# Patient Record
Sex: FEMALE | Race: AMERICAN INDIAN OR ALASKA NATIVE
[De-identification: names, ages, dates, MRNs, and addresses within clinical notes are randomized per-mention and may not be internally consistent; named-entity substitution may affect disease eponyms.]

---

## 2017-03-16 ENCOUNTER — HOSPITAL ENCOUNTER (EMERGENCY)
Dept: HOSPITAL 14 - H.ER | Age: 57
LOS: 1 days | Discharge: HOME | End: 2017-03-17
Payer: COMMERCIAL

## 2017-03-16 VITALS
DIASTOLIC BLOOD PRESSURE: 76 MMHG | SYSTOLIC BLOOD PRESSURE: 125 MMHG | RESPIRATION RATE: 15 BRPM | TEMPERATURE: 98.1 F | OXYGEN SATURATION: 96 % | HEART RATE: 84 BPM

## 2017-03-16 VITALS — BODY MASS INDEX: 24.8 KG/M2

## 2017-03-16 DIAGNOSIS — R51: Primary | ICD-10-CM

## 2017-03-16 DIAGNOSIS — Z79.84: ICD-10-CM

## 2017-03-16 DIAGNOSIS — E11.9: ICD-10-CM

## 2017-03-16 DIAGNOSIS — I10: ICD-10-CM

## 2017-03-16 LAB
ALBUMIN/GLOB SERPL: 1.3 {RATIO} (ref 1–2.1)
ALP SERPL-CCNC: 74 U/L (ref 38–126)
ALT SERPL-CCNC: 63 U/L (ref 9–52)
APTT BLD: 24.2 SECONDS (ref 23.3–32.5)
AST SERPL-CCNC: 47 U/L (ref 14–36)
BASOPHILS # BLD AUTO: 0.1 K/UL (ref 0–0.2)
BASOPHILS NFR BLD: 2.4 % (ref 0–2)
BILIRUB SERPL-MCNC: 0.6 MG/DL (ref 0.2–1.3)
BUN SERPL-MCNC: 12 MG/DL (ref 7–17)
CALCIUM SERPL-MCNC: 10 MG/DL (ref 8.4–10.2)
CHLORIDE SERPL-SCNC: 100 MMOL/L (ref 98–107)
CO2 SERPL-SCNC: 26 MMOL/L (ref 22–30)
EOSINOPHIL # BLD AUTO: 0.1 K/UL (ref 0–0.7)
EOSINOPHIL NFR BLD: 3.2 % (ref 0–4)
ERYTHROCYTE [DISTWIDTH] IN BLOOD BY AUTOMATED COUNT: 13.2 % (ref 11.5–14.5)
GLOBULIN SER-MCNC: 3.6 GM/DL (ref 2.2–3.9)
GLUCOSE SERPL-MCNC: 150 MG/DL (ref 65–105)
HCT VFR BLD CALC: 41.4 % (ref 34–47)
LYMPHOCYTES # BLD AUTO: 2.3 K/UL (ref 1–4.3)
LYMPHOCYTES NFR BLD AUTO: 50 % (ref 20–40)
MCH RBC QN AUTO: 29.5 PG (ref 27–31)
MCHC RBC AUTO-ENTMCNC: 33.3 G/DL (ref 33–37)
MCV RBC AUTO: 88.4 FL (ref 81–99)
MONOCYTES # BLD: 0.5 K/UL (ref 0–0.8)
MONOCYTES NFR BLD: 10.4 % (ref 0–10)
NEUTROPHILS # BLD: 1.6 K/UL (ref 1.8–7)
NEUTROPHILS NFR BLD AUTO: 34 % (ref 50–75)
NRBC BLD AUTO-RTO: 0 % (ref 0–0)
PLATELET # BLD: 206 K/UL (ref 130–400)
PMV BLD AUTO: 11 FL (ref 7.2–11.7)
POTASSIUM SERPL-SCNC: 4 MMOL/L (ref 3.6–5)
PROT SERPL-MCNC: 8.1 G/DL (ref 6.3–8.2)
SODIUM SERPL-SCNC: 137 MMOL/L (ref 132–148)
WBC # BLD AUTO: 4.6 K/UL (ref 4.8–10.8)

## 2017-03-16 NOTE — ED PDOC
HPI:  Headache


Time Seen by Provider: 17 21:32


Chief Complaint (Nursing): Headache


Chief Complaint (Provider): Headache


History Per: Patient


History/Exam Limitations: no limitations


Onset/Duration Of Symptoms: Days (x1 week)


Current Symptoms Are (Timing): Still Present


Severity: Moderate


Preceeding Symptoms: None


Associated Symptoms: Other (intermittent dizziness/lightheadedness, 

intermittent drowsiness)


Additional Complaint(s): 


Samantha Cortez is a 56 year old female, with a past medical history inclusive 

of HTN and type II diabetes (medication compliant), who presents to the ED on  for the evaluation of a moderate, diffuse headache that she has 

experienced x1 week. Headache, further described as not worst of life, has been 

accompanied by intermittent senses of dizziness/lightheadedness, occasional 

right hand numbness/tingling (mild) as well as some intermittent feelings of 

drowsiness. Denies chest pain or shortness of breath.





PMD: Tyrone





Past Medical History


Reviewed: Historical Data, Nursing Documentation, Vital Signs


Vital Signs: 





 Last Vital Signs











Temp  98.1 F   17 21:20


 


Pulse  84   17 21:20


 


Resp  15   17 21:20


 


BP  125/76   17 21:20


 


Pulse Ox  96   17 21:20














- Medical History


PMH: Diabetes (type II), HTN


   Denies: Hypercholesterolemia





- Surgical History


Surgical History: No Surg Hx





- Family History


Family History: States: Unknown Family Hx





- Immunization History


Hx Tetanus Toxoid Vaccination: No


Hx Influenza Vaccination: No


Hx Pneumococcal Vaccination: No





- Home Medications


Home Medications: 


 Ambulatory Orders











 Medication  Instructions  Recorded


 


Atorvastatin [Lipitor] 10 mg PO DAILY 16


 


Metformin ER [Glucophage XR] 850 mg PO DAILY 16


 


Aspirin [Aspirin Chewable] 81 mg PO DAILY 17


 


Clindamycin [Cleocin] 300 mg PO TID #30 cap 17


 


Amoxicillin/Clavulanate [Augmentin 1 tab PO BID #14 tab 17





875 MG-125 MG]  


 


Clotrimazole/Betamethasone 1 film TOP BID #30 g 17





[Lotrisone]  


 


Meclizine [Antivert] 25 mg PO Q8H PRN #20 tab 17


 


Famotidine [Pepcid] 40 mg PO DAILY #10 tab 17


 


Ibuprofen [Motrin] 600 mg PO TID PRN #30 tab 17


 


Ondansetron [Zofran] 4 mg PO Q8H #8 tab 17














- Allergies


Allergies/Adverse Reactions: 


 Allergies











Allergy/AdvReac Type Severity Reaction Status Date / Time


 


No Known Allergies Allergy   Verified 17 13:18














Review of Systems


Cardiovascular: Positive for: Light Headedness.  Negative for: Chest Pain


Respiratory: Negative for: Shortness of Breath


Neurological: Positive for: Numbness (occasional, right hand), Headache (diffuse

, not worst of life), Dizziness





Physical Exam





- Reviewed


Nursing Documentation Reviewed: Yes


Vital Signs Reviewed: Yes





- Physical Exam


Appears: Positive for: Non-toxic, No Acute Distress


Head Exam: Positive for: ATRAUMATIC, NORMOCEPHALIC


Skin: Positive for: Normal Color, Warm, Dry


Eye Exam: Positive for: Normal appearance, EOMI, PERRL


Cardiovascular/Chest: Positive for: Regular Rate, Rhythm.  Negative for: Murmur


Respiratory: Positive for: Normal Breath Sounds.  Negative for: Respiratory 

Distress


Extremity: Positive for: Normal ROM


Neurologic/Psych: Positive for: Alert, CNs II-XII (intact), Oriented, Gait (

steady).  Negative for: Motor/Sensory Deficits (5/5 x4 extremities, sensation 

intact), Aphasia, Facial Droop





- Laboratory Results


Result Diagrams: 


 17 21:54





 17 21:54





- ECG


O2 Sat by Pulse Oximetry: 96 (RA)


Pulse Ox Interpretation: Normal





Medical Decision Making


Medical Decision Makin:32


Initial Impression: headache, dizziness/lightheadedness





Initial Plan:


* EKG


* CT Head w/o contrast


* Labs


* Troponin I


* PTT


* PT


* Reevaluation





Pt initially states she was lightheaded and did not have a headache. On re-

evaluation pt reports headache. Tylenol ordered for pain. 


--------------------------------------------------------------------------------

----------------- 


Scribe Attestation:


Documented by Guerita Cardoso, acting as a scribe for Fauzia Lakhani PA-C.





Provider Scribe Attestation:


All medical record entries made by the Scribe were at my direction and 

personally dictated by me. I have reviewed the chart and agree that the record 

accurately reflects my personal performance of the history, physical exam, 

medical decision making, and the department course for this patient. I have 

also personally directed, reviewed, and agree with the discharge instructions 

and disposition.





Disposition





- Clinical Impression


Clinical Impression: 


 Headache





- Patient ED Disposition


Is Patient to be Admitted: No


Counseled Patient/Family Regarding: Diagnosis, Need For Followup





- Disposition


Referrals: 


Chidi Sheikh MD [Medical Doctor] - 


Disposition: Routine/Home


Disposition Time: 23:58


Condition: GOOD


Instructions:  Tension Headache (ED)

## 2017-03-16 NOTE — CT
EXAM:

  CT Head Without Intravenous Contrast.



CLINICAL HISTORY:

  56 years old, female; Pain; Headache; Headache not specified; Additional 

info: Lightheadedness, right hand tingling. Sent prior ct head from 4-1-2016



TECHNIQUE:

  Axial computed tomography images of the head/brain without intravenous 

contrast.  This CT exam was performed using one or more of the following dose 

reduction techniques:  automated exposure control, adjustment of the mA and/or 

kV according to patient size, and/or use of iterative reconstruction technique.

  Coronal and sagittal reformatted images were created and reviewed.



COMPARISON:

  CT - HEAD W/O CONTRAST 4/1/2016 10:49:33 AM



FINDINGS:

  Brain:  There is no evidence of intracranial hemorrhage.  No evidence of 

acute territorial infarction.  No significant white matter disease.  No edema.

  Ventricles:  Unremarkable.  No ventriculomegaly.

  Bones/joints:  Unremarkable.  No acute fracture.

  Soft tissues:  Unremarkable.

  Sinuses:  Unremarkable as visualized.  No acute sinusitis.

  Mastoid air cells:  Unremarkable as visualized.  No mastoid effusion.



There is no significant change from the prior study.



IMPRESSION:     

1.  No evidence for acute intracranial abnormality or displaced calvarial 

fracture.

2.  Additional incidental and/or chronic findings as described.

## 2017-04-08 ENCOUNTER — HOSPITAL ENCOUNTER (EMERGENCY)
Dept: HOSPITAL 14 - H.ER | Age: 57
Discharge: HOME | End: 2017-04-08
Payer: COMMERCIAL

## 2017-04-08 VITALS
SYSTOLIC BLOOD PRESSURE: 146 MMHG | OXYGEN SATURATION: 99 % | RESPIRATION RATE: 20 BRPM | TEMPERATURE: 98.2 F | DIASTOLIC BLOOD PRESSURE: 90 MMHG

## 2017-04-08 VITALS — HEART RATE: 70 BPM

## 2017-04-08 VITALS — BODY MASS INDEX: 24.8 KG/M2

## 2017-04-08 DIAGNOSIS — Z79.84: ICD-10-CM

## 2017-04-08 DIAGNOSIS — R55: ICD-10-CM

## 2017-04-08 DIAGNOSIS — I10: ICD-10-CM

## 2017-04-08 DIAGNOSIS — Z79.82: ICD-10-CM

## 2017-04-08 DIAGNOSIS — Z82.49: ICD-10-CM

## 2017-04-08 DIAGNOSIS — E11.9: ICD-10-CM

## 2017-04-08 DIAGNOSIS — R51: Primary | ICD-10-CM

## 2017-04-08 DIAGNOSIS — R53.1: ICD-10-CM

## 2017-04-08 LAB
ALBUMIN/GLOB SERPL: 1.2 {RATIO} (ref 1–2.1)
ALP SERPL-CCNC: 82 U/L (ref 38–126)
ALT SERPL-CCNC: 44 U/L (ref 9–52)
AST SERPL-CCNC: 44 U/L (ref 14–36)
BASOPHILS # BLD AUTO: 0 K/UL (ref 0–0.2)
BASOPHILS NFR BLD: 0.3 % (ref 0–2)
BILIRUB SERPL-MCNC: 0.5 MG/DL (ref 0.2–1.3)
BUN SERPL-MCNC: 15 MG/DL (ref 7–17)
CALCIUM SERPL-MCNC: 10.4 MG/DL (ref 8.4–10.2)
CHLORIDE SERPL-SCNC: 102 MMOL/L (ref 98–107)
CO2 SERPL-SCNC: 27 MMOL/L (ref 22–30)
EOSINOPHIL # BLD AUTO: 0.2 K/UL (ref 0–0.7)
EOSINOPHIL NFR BLD: 4.5 % (ref 0–4)
ERYTHROCYTE [DISTWIDTH] IN BLOOD BY AUTOMATED COUNT: 13.2 % (ref 11.5–14.5)
ETHANOL SERPL-MCNC: < 10 MG/DL (ref 0–10)
GLOBULIN SER-MCNC: 3.5 GM/DL (ref 2.2–3.9)
GLUCOSE SERPL-MCNC: 127 MG/DL (ref 65–105)
HCT VFR BLD CALC: 42 % (ref 34–47)
LYMPHOCYTES # BLD AUTO: 2.4 K/UL (ref 1–4.3)
LYMPHOCYTES NFR BLD AUTO: 50.4 % (ref 20–40)
MAGNESIUM SERPL-MCNC: 1.7 MG/DL (ref 1.6–2.3)
MCH RBC QN AUTO: 29.4 PG (ref 27–31)
MCHC RBC AUTO-ENTMCNC: 33 G/DL (ref 33–37)
MCV RBC AUTO: 89.1 FL (ref 81–99)
MONOCYTES # BLD: 0.5 K/UL (ref 0–0.8)
MONOCYTES NFR BLD: 11.4 % (ref 0–10)
NEUTROPHILS # BLD: 1.6 K/UL (ref 1.8–7)
NEUTROPHILS NFR BLD AUTO: 33.4 % (ref 50–75)
NRBC BLD AUTO-RTO: 0.2 % (ref 0–0)
PHOSPHATE SERPL-MCNC: 3.5 MG/DL (ref 2.5–4.5)
PLATELET # BLD: 211 K/UL (ref 130–400)
PMV BLD AUTO: 11.4 FL (ref 7.2–11.7)
POTASSIUM SERPL-SCNC: 4 MMOL/L (ref 3.6–5)
PROT SERPL-MCNC: 7.6 G/DL (ref 6.3–8.2)
SODIUM SERPL-SCNC: 143 MMOL/L (ref 132–148)
TSH SERPL-ACNC: 1.3 MIU/ML (ref 0.46–4.68)
WBC # BLD AUTO: 4.7 K/UL (ref 4.8–10.8)

## 2017-04-08 NOTE — ED PDOC
HPI:  Headache


Time Seen by Provider: 17 18:40


Chief Complaint (Nursing): Syncope


Chief Complaint (Provider): Headache


History Per: Patient


History/Exam Limitations: no limitations


Onset/Duration Of Symptoms: Days


Current Symptoms Are (Timing): Better


Severity: Mild


Preceeding Symptoms: None


Additional Complaint(s): 


Samantha Cortez is a 57 year old female, with a past medical history inclusive 

of HTN and type II diabetes (medication complaint, controlled), who presents to 

the ED on 17 for the evaluation of a frontal headache that she has 

experienced intermittently over the past couple of weeks. She also reports some 

occasional tingling within her right hand but that this has been a chronic 

issue. Denies focal weakness or dysarthria, and further reports that pain is 

mild within the ED. Patient was evaluated in the ED for this complaint 

approximately 3 weeks ago via CT and labwork but was discharged home with a 

diagnosis of headache after all studies resulted as normal.





Patient also has an acute secondary complaint of intermittent voice hoarseness 

that she has experienced x6 days. Denies fever, chills, outright throat pain, 

neck swelling, cough, rhinorrhea, nausea or vomiting.





PMD: Tyrone ([facility])





Past Medical History


Reviewed: Historical Data, Nursing Documentation, Vital Signs


Vital Signs: 





 Last Vital Signs











Temp  98.2 F   17 18:25


 


Pulse  72   17 18:25


 


Resp  20   17 18:25


 


BP  146/90   17 18:25


 


Pulse Ox  99   17 18:25














- Medical History


PMH: Diabetes (type II), HTN


   Denies: Hypercholesterolemia





- Surgical History


Other surgeries: right breast biopsy





- Family History


Family History: States: CAD, Diabetes





- Social History


Current smoker - smoking cessation education provided: No


Alcohol: None


Drugs: Denies





- Immunization History


Hx Tetanus Toxoid Vaccination: No


Hx Influenza Vaccination: No


Hx Pneumococcal Vaccination: No





- Home Medications


Home Medications: 


 Ambulatory Orders











 Medication  Instructions  Recorded


 


Atorvastatin [Lipitor] 10 mg PO DAILY 16


 


Metformin ER [Glucophage XR] 850 mg PO DAILY 16


 


Aspirin [Aspirin Chewable] 81 mg PO DAILY 17


 


Clindamycin [Cleocin] 300 mg PO TID #30 cap 17


 


Amoxicillin/Clavulanate [Augmentin 1 tab PO BID #14 tab 17





875 MG-125 MG]  


 


Clotrimazole/Betamethasone 1 film TOP BID #30 g 17





[Lotrisone]  


 


Meclizine [Antivert] 25 mg PO Q8H PRN #20 tab 17


 


Famotidine [Pepcid] 40 mg PO DAILY #10 tab 17


 


Ibuprofen [Motrin] 600 mg PO TID PRN #30 tab 17


 


Ondansetron [Zofran] 4 mg PO Q8H #8 tab 17


 


Naproxen [Naprosyn] 1 tab PO BID PRN #30 tab 17














- Allergies


Allergies/Adverse Reactions: 


 Allergies











Allergy/AdvReac Type Severity Reaction Status Date / Time


 


No Known Allergies Allergy   Verified 17 13:18














Review of Systems


ROS Statement: Except As Marked, All Systems Reviewed And Found Negative


Constitutional: Positive for: Weakness (chronic intermittent generalized 

weakness/fatigue (pt admits to only sleeping 4-5 hrs/night)).  Negative for: 

Fever, Chills


ENT: Positive for: Other (voce hoarseness).  Negative for: Nose Discharge, 

Throat Pain


Respiratory: Negative for: Cough


Gastrointestinal: Negative for: Nausea, Vomiting


Musculoskeletal: Negative for: Neck Pain (no swelling)


Skin: Positive for: Other ("lump" in middle of breasts (found this week))


Neurological: Positive for: Headache (mild in ED, frontal)





Physical Exam





- Reviewed


Nursing Documentation Reviewed: Yes


Vital Signs Reviewed: Yes





- Physical Exam


Appears: Positive for: Non-toxic, No Acute Distress


Head Exam: Positive for: ATRAUMATIC, NORMOCEPHALIC


Skin: Positive for: Normal Color, Warm, Dry


Eye Exam: Positive for: Normal appearance, PERRL


ENT: Positive for: Normal ENT Inspection.  Negative for: Pharyngeal Erythema, 

Tonsillar Exudate, Tonsillar Swelling


Neck: Positive for: Normal, Painless ROM, Supple


Cardiovascular/Chest: Positive for: Regular Rate, Rhythm, Chest Non Tender (no 

palpable breast masses).  Negative for: Edema (no leg swelling), Murmur


Respiratory: Positive for: Normal Breath Sounds.  Negative for: Respiratory 

Distress


Gastrointestinal/Abdominal: Positive for: Normal Exam, Soft.  Negative for: 

Tenderness


Back: Positive for: Normal Inspection


Extremity: Positive for: Normal ROM.  Negative for: Swelling


Neurologic/Psych: Positive for: Alert, Oriented





- Laboratory Results


Result Diagrams: 


 17 19:05





 17 19:05





- ECG


ECG: Positive for: Interpreted By Me, Viewed By Me


ECG Rhythm: Positive for: Normal QRS, Normal ST Segment, Sinus Rhythm


Rate: 70


O2 Sat by Pulse Oximetry: 99 (RA)


Pulse Ox Interpretation: Normal





Medical Decision Making


Medical Decision Makin:40


Initial Impression: headache, voice hoarseness, occasional generalized weakness





Differential diagnoses include but are not limited to migraine, electrolyte 

abnormality, hypothyroidism.





Initial Plan:


* EKG


* Labs


* Alcohol Serum


* Magnesium


* Phosphorus


* TSH


* Glucose/Blood/POC


* Udip


* Urine Drug Screen


* Rapid Strep


* Reevaluation





10p On reeval pt is sleeping comfortably. No clinically significant lab 

abnormalities. DW pt findings and plan of care.





--------------------------------------------------------------------------------

----------------- 


Scribe Attestation:


Documented by Guerita Cardoso, acting as a scribe for Lesley Marcus MD.





Provider Scribe Attestation:


All medical record entries made by the Scribe were at my direction and 

personally dictated by me. I have reviewed the chart and agree that the record 

accurately reflects my personal performance of the history, physical exam, 

medical decision making, and the department course for this patient. I have 

also personally directed, reviewed, and agree with the discharge instructions 

and disposition.





Disposition





- Clinical Impression


Clinical Impression: 


 Headache


Counseled Patient/Family Regarding: Studies Performed, Diagnosis, Need For 

Followup, Rx Given





- Disposition


Referrals: 


 Service [Outside]


Gail Varela MD [Staff Provider] - 


Disposition: Routine/Home


Disposition Time: 22:00


Condition: IMPROVED


Additional Instructions: 


FOLLOW UP WITH DR DELATORRE MONDAY AND ALSO TRY TO SEE A NEUROLOGIST BY THE END OF 

NEXT WEEK AS WELL.


Prescriptions: 


Naproxen [Naprosyn] 1 tab PO BID PRN #30 tab


 PRN Reason: Pain


Instructions:  Acute Headache (ED), Weakness (ED)

## 2017-04-09 NOTE — CARD
--------------- APPROVED REPORT --------------





EKG Measurement

Heart [id number]

[place] [id number]

[id number]

[id number]

[id number]



<Conclusion>

Normal sinus rhythm

Normal ECG

## 2017-05-15 ENCOUNTER — HOSPITAL ENCOUNTER (EMERGENCY)
Dept: HOSPITAL 14 - H.ER | Age: 57
Discharge: HOME | End: 2017-05-15
Payer: COMMERCIAL

## 2017-05-15 VITALS
SYSTOLIC BLOOD PRESSURE: 114 MMHG | RESPIRATION RATE: 16 BRPM | OXYGEN SATURATION: 95 % | TEMPERATURE: 98.9 F | HEART RATE: 94 BPM | DIASTOLIC BLOOD PRESSURE: 76 MMHG

## 2017-05-15 VITALS — BODY MASS INDEX: 24.8 KG/M2

## 2017-05-15 DIAGNOSIS — E11.9: ICD-10-CM

## 2017-05-15 DIAGNOSIS — Z79.82: ICD-10-CM

## 2017-05-15 DIAGNOSIS — Z79.84: ICD-10-CM

## 2017-05-15 DIAGNOSIS — I10: ICD-10-CM

## 2017-05-15 DIAGNOSIS — N39.0: Primary | ICD-10-CM

## 2017-05-15 LAB
ALBUMIN/GLOB SERPL: 1.3 {RATIO} (ref 1–2.1)
ALP SERPL-CCNC: 84 U/L (ref 38–126)
ALT SERPL-CCNC: 60 U/L (ref 9–52)
AST SERPL-CCNC: 40 U/L (ref 14–36)
BACTERIA #/AREA URNS HPF: (no result) /[HPF]
BASOPHILS # BLD AUTO: 0.1 K/UL (ref 0–0.2)
BASOPHILS NFR BLD: 1 % (ref 0–2)
BILIRUB SERPL-MCNC: 0.6 MG/DL (ref 0.2–1.3)
BILIRUB UR-MCNC: NEGATIVE MG/DL
BUN SERPL-MCNC: 9 MG/DL (ref 7–17)
CALCIUM SERPL-MCNC: 9.8 MG/DL (ref 8.4–10.2)
CHLORIDE SERPL-SCNC: 99 MMOL/L (ref 98–107)
CO2 SERPL-SCNC: 26 MMOL/L (ref 22–30)
COLOR UR: YELLOW
EOSINOPHIL # BLD AUTO: 0.1 K/UL (ref 0–0.7)
EOSINOPHIL NFR BLD: 1 % (ref 0–4)
ERYTHROCYTE [DISTWIDTH] IN BLOOD BY AUTOMATED COUNT: 13.3 % (ref 11.5–14.5)
GLOBULIN SER-MCNC: 3.3 GM/DL (ref 2.2–3.9)
GLUCOSE SERPL-MCNC: 224 MG/DL (ref 65–105)
GLUCOSE UR STRIP-MCNC: 150 MG/DL
HCT VFR BLD CALC: 42 % (ref 34–47)
KETONES UR STRIP-MCNC: NEGATIVE MG/DL
LEUKOCYTE ESTERASE UR-ACNC: (no result) LEU/UL
LYMPHOCYTES # BLD AUTO: 1.2 K/UL (ref 1–4.3)
LYMPHOCYTES NFR BLD AUTO: 20.3 % (ref 20–40)
MCH RBC QN AUTO: 29.6 PG (ref 27–31)
MCHC RBC AUTO-ENTMCNC: 33.4 G/DL (ref 33–37)
MCV RBC AUTO: 88.6 FL (ref 81–99)
MONOCYTES # BLD: 0.6 K/UL (ref 0–0.8)
MONOCYTES NFR BLD: 10.3 % (ref 0–10)
NEUTROPHILS # BLD: 4 K/UL (ref 1.8–7)
NEUTROPHILS NFR BLD AUTO: 67.4 % (ref 50–75)
NRBC BLD AUTO-RTO: 0.1 % (ref 0–0)
PH UR STRIP: 5 [PH] (ref 5–8)
PLATELET # BLD: 159 K/UL (ref 130–400)
PMV BLD AUTO: 11.2 FL (ref 7.2–11.7)
POTASSIUM SERPL-SCNC: 3.6 MMOL/L (ref 3.6–5)
PROT SERPL-MCNC: 7.7 G/DL (ref 6.3–8.2)
PROT UR STRIP-MCNC: 30 MG/DL
RBC # UR STRIP: (no result) /UL
RBC #/AREA URNS HPF: 15 /HPF (ref 0–3)
SODIUM SERPL-SCNC: 135 MMOL/L (ref 132–148)
SP GR UR STRIP: 1.02 (ref 1–1.03)
UROBILINOGEN UR-MCNC: (no result) MG/DL (ref 0.2–1)
WBC # BLD AUTO: 5.9 K/UL (ref 4.8–10.8)
WBC #/AREA URNS HPF: 25 /HPF (ref 0–5)

## 2017-05-15 NOTE — ED PDOC
- Laboratory Results


Result Diagrams: 


 05/15/17 17:35





 05/15/17 17:35





- ECG


O2 Sat by Pulse Oximetry: 95





- Progress


ED Course And Treament: 





Case endorsed to writer from Eleno FULLER pending labs, urine





On re-eval, patient states she is feeling better.  Patient without abdominal,

flank, or CVA tenderness.


Patient educated on findings, discharged with rx cipro (dose given), ibuprofen.


Advised follow up PMD 2-3 days.


Return to ED for worsening/concerning symptoms.





Disposition





- Clinical Impression


Clinical Impression: 


 Urinary tract infection








- POA


Present On Arrival: None





- Disposition


Disposition: Routine/Home


Disposition Time: 21:29


Condition: IMPROVED


Prescriptions: 


Ciprofloxacin HCl [Cipro] 500 mg PO BID #13 tab


Ibuprofen [Motrin Tab] 1 tab PO Q6 PRN #20 tab


 PRN Reason: Fever >100.4 F


Instructions:  Urinary Tract Infection in Women (ED)

## 2017-05-15 NOTE — ED PDOC
HPI: Abdomen


Time Seen by Provider: 05/15/17 17:44


Chief Complaint (Nursing): Fever


Chief Complaint (Provider): Fever


History Per: Patient


Additional Complaint(s): 





56 yo female, no PMH, presents into ER c/o fever and painful urination since 

yesterday. states she did not take any meds for fever. 


No nausea or vomiting. No abdominal pain or back pain





Past Medical History


Reviewed: Nursing Documentation, Vital Signs


Vital Signs: 


 Last Vital Signs











Temp  100.8 F H  05/15/17 17:24


 


Pulse  110 H  05/15/17 17:24


 


Resp  20   05/15/17 17:24


 


BP  150/92 H  05/15/17 17:24


 


Pulse Ox  96   05/15/17 18:31














- Medical History


PMH: Diabetes (type II), HTN


   Denies: Hypercholesterolemia





- Family History


Family History: States: Unknown Family Hx, CAD, Diabetes





- Living Arrangements


Living Arrangements: With Family





- Social History


Current smoker - smoking cessation education provided: No


Alcohol: None


Drugs: Denies





- Immunization History


Hx Tetanus Toxoid Vaccination: No


Hx Influenza Vaccination: No


Hx Pneumococcal Vaccination: No





- Home Medications


Home Medications: 


 Ambulatory Orders











 Medication  Instructions  Recorded


 


Atorvastatin [Lipitor] 10 mg PO DAILY 12/02/16


 


Metformin ER [Glucophage XR] 850 mg PO DAILY 12/02/16


 


Aspirin [Aspirin Chewable] 81 mg PO DAILY 01/03/17


 


Clindamycin [Cleocin] 300 mg PO TID #30 cap 01/03/17


 


Amoxicillin/Clavulanate [Augmentin 1 tab PO BID #14 tab 01/14/17





875 MG-125 MG]  


 


Clotrimazole/Betamethasone 1 film TOP BID #30 g 01/14/17





[Lotrisone]  


 


Meclizine [Antivert] 25 mg PO Q8H PRN #20 tab 01/16/17


 


Famotidine [Pepcid] 40 mg PO DAILY #10 tab 01/22/17


 


Ibuprofen [Motrin] 600 mg PO TID PRN #30 tab 01/22/17


 


Ondansetron [Zofran] 4 mg PO Q8H #8 tab 01/22/17


 


Naproxen [Naprosyn] 1 tab PO BID PRN #30 tab 04/08/17














- Allergies


Allergies/Adverse Reactions: 


 Allergies











Allergy/AdvReac Type Severity Reaction Status Date / Time


 


No Known Allergies Allergy   Verified 05/15/17 17:24














Review of Systems


ROS Statement: Except As Marked, All Systems Reviewed And Found Negative





Physical Exam





- Reviewed


Nursing Documentation Reviewed: Yes


Vital Signs Reviewed: Yes





- Physical Exam


Appears: Positive for: Well, Non-toxic, No Acute Distress


Head Exam: Positive for: ATRAUMATIC, NORMAL INSPECTION, NORMOCEPHALIC


Skin: Positive for: Normal Color, Warm, DRY


Eye Exam: Positive for: EOMI, Normal appearance, PERRL


ENT: Positive for: Normal ENT Inspection


Neck: Positive for: Normal, Painless ROM


Cardiovascular/Chest: Positive for: Regular Rate, Rhythm


Respiratory: Positive for: CNT, Normal Breath Sounds


Gastrointestinal/Abdominal: Positive for: Normal Exam, Bowel Sounds, Soft


Back: Positive for: Normal Inspection


Extremity: Positive for: Normal ROM


Neurologic/Psych: Positive for: Alert, Oriented





- Laboratory Results


Result Diagrams: 


 05/15/17 17:35





 05/15/17 17:35





- ECG


O2 Sat by Pulse Oximetry: 96





Medical Decision Making


Medical Decision Making: 





IV access established and treatment initiated with IVF and Toradol





CBC resulted WNL


COMP with elevated glucose 224, IVF running








UA pending 19:47





Case endorsed to ALINA Ibarra at 20:00 pending UA and antibiotics if needed








Disposition





- Clinical Impression


Clinical Impression: 


 Urinary tract infection








- Patient ED Disposition


Is Patient to be Admitted: No





- Disposition


Disposition: Transfer of Care ([facility])


Disposition Time: 19:45


Condition: STABLE





- POA


Present On Arrival: Poor Glycemic Control

## 2017-05-17 ENCOUNTER — HOSPITAL ENCOUNTER (EMERGENCY)
Dept: HOSPITAL 14 - H.ER | Age: 57
Discharge: HOME | End: 2017-05-17
Payer: COMMERCIAL

## 2017-05-17 VITALS
HEART RATE: 86 BPM | OXYGEN SATURATION: 98 % | TEMPERATURE: 99.2 F | DIASTOLIC BLOOD PRESSURE: 78 MMHG | SYSTOLIC BLOOD PRESSURE: 144 MMHG | RESPIRATION RATE: 18 BRPM

## 2017-05-17 VITALS — BODY MASS INDEX: 24.8 KG/M2

## 2017-05-17 DIAGNOSIS — E11.9: ICD-10-CM

## 2017-05-17 DIAGNOSIS — I10: ICD-10-CM

## 2017-05-17 DIAGNOSIS — N39.0: Primary | ICD-10-CM

## 2017-05-17 LAB
BACTERIA #/AREA URNS HPF: (no result) /[HPF]
BILIRUB UR-MCNC: NEGATIVE MG/DL
COLOR UR: YELLOW
GLUCOSE UR STRIP-MCNC: >=500 MG/DL
KETONES UR STRIP-MCNC: NEGATIVE MG/DL
LEUKOCYTE ESTERASE UR-ACNC: (no result) LEU/UL
PH UR STRIP: 6 [PH] (ref 5–8)
PROT UR STRIP-MCNC: 100 MG/DL
RBC # UR STRIP: NEGATIVE /UL
RBC #/AREA URNS HPF: 3 /HPF (ref 0–3)
SP GR UR STRIP: 1.01 (ref 1–1.03)
UROBILINOGEN UR-MCNC: (no result) MG/DL (ref 0.2–1)
WBC #/AREA URNS HPF: 12 /HPF (ref 0–5)

## 2017-05-17 NOTE — ED PDOC
HPI: General Adult


Time Seen by Provider: 05/17/17 19:51


Chief Complaint (Nursing): Flu-like Symptoms


History Per: Patient


History/Exam Limitations: no limitations


Onset/Duration Of Symptoms: Days (X 2)


Have you had recent travel within the past 21 days to any of the following 

countries: [place], [place], [place] or [place]?: No


Additional Complaint(s): 





Samantha Cortez is a 57 year old female, with no previous medical history, who 

presents to the ED with complaints of dysuria persisting for the past two days. 

Patient was seen in the ED 2 days ago and reports symptoms still persisting and 

reports developing a cough. Patient denies any nausea, vomiting, fever chills. 





PMD: none provided





Past Medical History


Reviewed: Historical Data, Nursing Documentation, Vital Signs


Vital Signs: 


 Last Vital Signs











Temp  99.2 F   05/17/17 19:44


 


Pulse  86   05/17/17 19:44


 


Resp  18   05/17/17 19:44


 


BP  144/78   05/17/17 19:44


 


Pulse Ox  98   05/17/17 20:35














- Medical History


PMH: Diabetes (type II), HTN


   Denies: Hypercholesterolemia





- Family History


Family History: States: Unknown Family Hx, CAD, Diabetes





- Immunization History


Hx Tetanus Toxoid Vaccination: No


Hx Influenza Vaccination: No


Hx Pneumococcal Vaccination: No





- Home Medications


Home Medications: 


 Ambulatory Orders











 Medication  Instructions  Recorded


 


Atorvastatin [Lipitor] 10 mg PO DAILY 12/02/16


 


Metformin ER [Glucophage XR] 850 mg PO DAILY 12/02/16


 


Aspirin [Aspirin Chewable] 81 mg PO DAILY 01/03/17


 


Clindamycin [Cleocin] 300 mg PO TID #30 cap 01/03/17


 


Amoxicillin/Clavulanate [Augmentin 1 tab PO BID #14 tab 01/14/17





875 MG-125 MG]  


 


Clotrimazole/Betamethasone 1 film TOP BID #30 g 01/14/17





[Lotrisone]  


 


Meclizine [Antivert] 25 mg PO Q8H PRN #20 tab 01/16/17


 


Famotidine [Pepcid] 40 mg PO DAILY #10 tab 01/22/17


 


Ibuprofen [Motrin] 600 mg PO TID PRN #30 tab 01/22/17


 


Ondansetron [Zofran] 4 mg PO Q8H #8 tab 01/22/17


 


Naproxen [Naprosyn] 1 tab PO BID PRN #30 tab 04/08/17


 


Ciprofloxacin HCl [Cipro] 500 mg PO BID #13 tab 05/15/17


 


Ibuprofen [Motrin Tab] 1 tab PO Q6 PRN #20 tab 05/15/17














- Allergies


Allergies/Adverse Reactions: 


 Allergies











Allergy/AdvReac Type Severity Reaction Status Date / Time


 


No Known Allergies Allergy   Verified 05/15/17 17:24














Review of Systems


ROS Statement: Except As Marked, All Systems Reviewed And Found Negative


Constitutional: Negative for: Fever, Chills


Respiratory: Positive for: Cough


Gastrointestinal: Negative for: Nausea, Vomiting


Genitourinary Female: Positive for: Dysuria





Physical Exam





- Reviewed


Nursing Documentation Reviewed: Yes


Vital Signs Reviewed: Yes





- Physical Exam


Appears: Positive for: Well, Non-toxic, No Acute Distress


Head Exam: Positive for: ATRAUMATIC, NORMAL INSPECTION, NORMOCEPHALIC


Skin: Positive for: Normal Color, Warm, DRY


Eye Exam: Positive for: EOMI, Normal appearance, PERRL


ENT: Positive for: Normal ENT Inspection


Neck: Positive for: Normal, Painless ROM


Cardiovascular/Chest: Positive for: Regular Rate, Rhythm


Respiratory: Positive for: CNT, Normal Breath Sounds


Neurologic/Psych: Positive for: Alert, Oriented





- ECG


O2 Sat by Pulse Oximetry: 98 (RA)


Pulse Ox Interpretation: Normal





Medical Decision Making


Medical Decision Making: 





Initial Plan: 


* urinalysis 


* urine culture 


* CXR








CXR without acute abnormalities


Urine appears improved from previous. Blood cultures from 2 days ago negative. 

Urine culture <100,000units





--------------------------------------------------------------------------------

----------------- 


Scribe Attestation:


Documented by Saranya Freire, acting as a scribe for Fauzia Lakhani PA-C.





Provider Scribe Attestation:


All medical record entries made by the Scribe were at my direction and 

personally dictated by me. I have reviewed the chart and agree that the record 

accurately reflects my personal performance of the history, physical exam, 

medical decision making, and the department course for this patient. I have 

also personally directed, reviewed, and agree with the discharge instructions 

and disposition.





Disposition





- Clinical Impression


Clinical Impression: 


 Urinary tract infection








- Patient ED Disposition


Is Patient to be Admitted: No


Counseled Patient/Family Regarding: Diagnosis, Need For Followup





- Disposition


Referrals: 


[facility] [Outside]


Disposition: Routine/Home


Disposition Time: 21:57


Condition: GOOD


Additional Instructions: 


Please follow-up with PMD.


Continue Cirpo. 


Instructions:  Urinary Tract Infection in Women (ED)

## 2017-05-18 NOTE — RAD
HISTORY:

cough  



COMPARISON:

1/22/2017



TECHNIQUE:

Chest PA and lateral



FINDINGS:



LUNGS:

No active pulmonary disease.



PLEURA:

No significant pleural effusion identified. No pneumothorax apparent.



CARDIOVASCULAR:

Normal.



OSSEOUS STRUCTURES:

No significant abnormalities.



VISUALIZED UPPER ABDOMEN:

Normal.



OTHER FINDINGS:

None.



IMPRESSION:

No active disease.

## 2017-07-19 ENCOUNTER — HOSPITAL ENCOUNTER (EMERGENCY)
Dept: HOSPITAL 31 - C.ER | Age: 57
Discharge: HOME | End: 2017-07-19
Payer: COMMERCIAL

## 2017-07-19 VITALS — RESPIRATION RATE: 18 BRPM

## 2017-07-19 VITALS — SYSTOLIC BLOOD PRESSURE: 120 MMHG | TEMPERATURE: 98.4 F | HEART RATE: 95 BPM | DIASTOLIC BLOOD PRESSURE: 80 MMHG

## 2017-07-19 VITALS — BODY MASS INDEX: 24.8 KG/M2

## 2017-07-19 VITALS — OXYGEN SATURATION: 99 %

## 2017-07-19 DIAGNOSIS — Z79.84: ICD-10-CM

## 2017-07-19 DIAGNOSIS — E11.65: Primary | ICD-10-CM

## 2017-07-19 NOTE — C.PDOC
History Of Present Illness


Patient is a 56 y/o female, with past medical history of diabetes, presents to 

the emergency department for evaluation of high blood sugar level. Patient 

states she was recently started on Januvia. Patient states her local pharmacist 

had checked her sugar level and was found to be in "300s", and was told to 

report to the ER. Patient denies any physical complaints at this time. 

Accucheck on arrival was 289mg/dl.





Time Seen by Provider: 07/19/17 15:55


Chief Complaint (Nursing): High Blood Sugar


History Per: Patient


History/Exam Limitations: no limitations


Onset/Duration Of Symptoms: Gradual


Current Symptoms Are (Timing): Still Present


Severity: None


Pain Scale Rating Of: 0


Current Diabetic Medications: Oral Medication


Associated Infectious Symptoms: denies: Cough, Sore Throat, Sinus Congestion, 

Dysuria, Urinary Urgency, Urinary Frequency, Nausea, Vomiting, Diarrhea


Treatment Prior To Provider Evaluation: Accucheck


Recent travel outside of the [place]: No


Additional History Per: Patient





Past Medical History


Reviewed: Historical Data, Nursing Documentation, Vital Signs


Vital Signs: 


 Last Vital Signs











Temp  98.4 F   07/19/17 16:31


 


Pulse  95 H  07/19/17 16:31


 


Resp  18   07/19/17 16:31


 


BP  120/80   07/19/17 16:31


 


Pulse Ox  99   07/19/17 16:36














- Medical History


PMH: Diabetes (type II), HTN


   Denies: Hypercholesterolemia


Family History: States: Unknown Family Hx, CAD, Diabetes





- Social History


Hx Tobacco Use: No


Hx Alcohol Use: No


Hx Substance Use: No





- Immunization History


Hx Tetanus Toxoid Vaccination: No


Hx Influenza Vaccination: No


Hx Pneumococcal Vaccination: No





Review Of Systems


Except As Marked, All Systems Reviewed And Found Negative.


Constitutional: Negative for: Fever, Chills


Cardiovascular: Negative for: Chest Pain, Palpitations, Light Headedness


Respiratory: Negative for: Cough, Shortness of Breath


Gastrointestinal: Negative for: Nausea, Vomiting, Abdominal Pain


Neurological: Negative for: Headache, Dizziness





Physical Exam





- Physical Exam


Appears: Non-toxic, No Acute Distress


Skin: Normal Color, Warm, Dry


Head: Atraumatic, Normacephalic


Eye(s): bilateral: Normal Inspection


Neck: Normal ROM, Supple


Chest: Symmetrical


Cardiovascular: Rhythm Regular, No Murmur


Respiratory: Normal Breath Sounds, No Rales, No Rhonchi, No Wheezing


Gastrointestinal/Abdominal: Soft, No Tenderness


Extremity: Bilateral: Atraumatic, Normal ROM


Neurological/Psych: Oriented x3, Normal Speech, Normal Cognition


Gait: Steady





ED Course And Treatment


O2 Sat by Pulse Oximetry: 99





Medical Decision Making


Medical Decision Making: 





Patient found to have high blood sugar today and out of concern come to ED for 

assurance


Patient started on new medication today


denies any other complaint


requests discharge home





Disposition


Counseled Patient/Family Regarding: Diagnosis, Need For Followup





- Disposition


Referrals: 


Amarilis Jernigan MD [Medical Doctor] - 


Disposition: HOME/ ROUTINE


Disposition Time: 16:10


Condition: GOOD


Additional Instructions: 


Follow up with your PMD


Take medications as directed


Instructions:  Diabetic Hyperglycemia (ED)





- POA


Present On Arrival: None





- Clinical Impression


Clinical Impression: 


 Hyperglycemia








- PA / NP / Resident Statement


MD/DO has reviewed & agrees with the documentation as recorded.





- Scribe Statement


The provider has reviewed the documentation as recorded by the Scribe





Yunior Asencio





All medical record entries made by the Scribe were at my direction and 

personally dictated by me. I have reviewed the chart and agree that the record 

accurately reflects my personal performance of the history, physical exam, 

medical decision making, and the department course for this patient. I have 

also personally directed, reviewed, and agree with the discharge instructions 

and disposition.

## 2017-10-07 ENCOUNTER — HOSPITAL ENCOUNTER (EMERGENCY)
Dept: HOSPITAL 31 - C.ER | Age: 57
Discharge: HOME | End: 2017-10-07
Payer: COMMERCIAL

## 2017-10-07 VITALS — BODY MASS INDEX: 24.8 KG/M2

## 2017-10-07 VITALS
HEART RATE: 80 BPM | SYSTOLIC BLOOD PRESSURE: 124 MMHG | DIASTOLIC BLOOD PRESSURE: 74 MMHG | OXYGEN SATURATION: 98 % | TEMPERATURE: 97.9 F | RESPIRATION RATE: 18 BRPM

## 2017-10-07 DIAGNOSIS — K06.8: Primary | ICD-10-CM

## 2017-10-07 NOTE — C.PDOC
History Of Present Illness


56 yo female c/o gum pain for 1 week. Does not know what caused the pain, she 

hast had teeth there in a long time. No trauma. No swelling, no fever, no 

difficulty breathing or swallowing. PT admits that she can not recall the last 

time she went to the dentist. 


Time Seen by Provider: 10/07/17 13:07


Chief Complaint (Nursing): Dental Pain


History Per: Patient


History/Exam Limitations: no limitations


Onset/Duration Of Symptoms: Days


Current Symptoms Are (Timing): Still Present





Past Medical History


Vital Signs: 


 Last Vital Signs











Temp  97.9 F   10/07/17 13:10


 


Pulse  80   10/07/17 13:10


 


Resp  18   10/07/17 13:10


 


BP  124/74   10/07/17 13:10


 


Pulse Ox  98   10/07/17 13:24














- Medical History


PMH: Diabetes (type II), HTN


   Denies: Hypercholesterolemia


Family History: States: Unknown Family Hx, CAD, Diabetes





- Social History


Hx Tobacco Use: No


Hx Alcohol Use: No


Hx Substance Use: No





- Immunization History


Hx Tetanus Toxoid Vaccination: No


Hx Influenza Vaccination: No


Hx Pneumococcal Vaccination: No





Review Of Systems


Except As Marked, All Systems Reviewed And Found Negative.


Constitutional: Negative for: Fever


Cardiovascular: Negative for: Chest Pain


Respiratory: Negative for: Shortness of Breath





Physical Exam





- Physical Exam


Appears: Well, Non-toxic, No Acute Distress


Skin: Normal Color, Warm, Dry


Head: Atraumatic, Normacephalic


Eye(s): bilateral: Normal Inspection, PERRL, EOMI


Nose: Normal


Oral Mucosa: Moist


Teeth: No Normal Dentition (poor dentition), Caries


Gingiva: Tender (left maxillary gingiva, no bleeding, no swelling,  no erythema 

or fluctuance)


Throat: Normal, No Erythema, No Exudate


Neck: Normal, Normal ROM, Supple


Chest: Symmetrical


Cardiovascular: Rhythm Regular


Respiratory: Normal Breath Sounds


Back: Normal Inspection


Extremity: Normal ROM


Neurological/Psych: Oriented x3, Normal Speech, Normal Cognition





ED Course And Treatment


O2 Sat by Pulse Oximetry: 98


Progress Note: PT instructed to follow up with dentist.





Disposition





- Disposition


Referrals: 


[facility] [facility] [Outside]


Disposition: HOME/ ROUTINE


Disposition Time: 13:23


Condition: STABLE


Additional Instructions: 


Follow up with your primary medical doctor or clinic in 2-5 days for further 

evaluation. Take medications as prescribed. Return to the emergency department 

at any time if symptoms persist or worsen.


Prescriptions: 


Amoxicillin 875 mg PO BID #14 tablet


Ketorolac Tromethamine [Toradol] 10 mg PO Q6 PRN #20 tab


 PRN Reason: Pain, Moderate (4-7)


Instructions:  Toothache (ED)


Forms:  [facility] (English)





- Clinical Impression


Clinical Impression: 


 Pain in gums

## 2017-10-18 ENCOUNTER — HOSPITAL ENCOUNTER (EMERGENCY)
Dept: HOSPITAL 31 - C.ER | Age: 57
Discharge: HOME | End: 2017-10-18
Payer: COMMERCIAL

## 2017-10-18 VITALS — RESPIRATION RATE: 18 BRPM

## 2017-10-18 VITALS
OXYGEN SATURATION: 97 % | HEART RATE: 90 BPM | TEMPERATURE: 97.7 F | SYSTOLIC BLOOD PRESSURE: 132 MMHG | DIASTOLIC BLOOD PRESSURE: 87 MMHG

## 2017-10-18 VITALS — BODY MASS INDEX: 23.3 KG/M2

## 2017-10-18 DIAGNOSIS — R51: Primary | ICD-10-CM

## 2017-10-18 NOTE — C.PDOC
History Of Present Illness


56 y/o female with PMHx of DM presents to ED with complaints of headache with 

associated nausea for 1 week. Patient denies fever, chills, vision changes, 

dizziness, weakness, numbness or any other complaints at this time.Patient 

requesting something to eat


Time Seen by Provider: 10/18/17 07:52


Chief Complaint (Nursing): Headache


History Per: Patient


History/Exam Limitations: no limitations


Onset/Duration Of Symptoms: Days


Current Symptoms Are (Timing): Still Present


Severity: Mild


Preceeding Symptoms: denies: Visual Disturbances


Associated Symptoms: Nausea.  denies: Photophobia, Blurred Vision





Past Medical History


Reviewed: Historical Data, Nursing Documentation, Vital Signs


Vital Signs: 


 Last Vital Signs











Temp  97.7 F   10/18/17 07:59


 


Pulse  90   10/18/17 07:59


 


Resp  18   10/18/17 08:57


 


BP  132/87   10/18/17 07:59


 


Pulse Ox  97   10/18/17 09:01














- Medical History


PMH: Diabetes (type II), HTN


Surgical History: No Surg Hx


Family History: States: CAD, Diabetes





- Social History


Hx Tobacco Use: No


Hx Alcohol Use: No


Hx Substance Use: No





- Immunization History


Hx Tetanus Toxoid Vaccination: No


Hx Influenza Vaccination: No


Hx Pneumococcal Vaccination: No





Review Of Systems


Constitutional: Negative for: Fever, Chills


Eyes: Negative for: Vision Change


Gastrointestinal: Positive for: Nausea.  Negative for: Vomiting, Abdominal Pain


Neurological: Positive for: Headache.  Negative for: Weakness, Numbness, 

Dizziness





Physical Exam





- Physical Exam


Appears: Non-toxic, No Acute Distress


Skin: Normal Color, Warm, Dry, No Rash


Head: Atraumatic, Normacephalic


Oral Mucosa: Moist


Neck: Normal ROM, Supple


Chest: Symmetrical


Cardiovascular: Rhythm Regular


Respiratory: Normal Breath Sounds, No Rales, No Rhonchi, No Wheezing


Extremity: Normal ROM, Capillary Refill (<2seconds)


Neurological/Psych: Oriented x3, Normal Speech, Normal Cognition, Normal Motor, 

Normal Sensation


Gait: Steady





ED Course And Treatment


O2 Sat by Pulse Oximetry: 97 (RA)


Pulse Ox Interpretation: Normal


Progress Note: Treated with motrin and zofran.  On re-evaluation feeling better

, neuro intact, ambulating with steady gait


Reassessment Condition: Improved





Medical Decision Making


Medical Decision Making: 


Patient upon evaluation is requesting breakfast





Plan: Motrin, Zofran 





Disposition


Counseled Patient/Family Regarding: Studies Performed, Diagnosis, Need For 

Followup





- Disposition


Referrals: 


[facility] [facility] [Outside]


[facility] [Outside]


Disposition: HOME/ ROUTINE


Disposition Time: 09:00


Condition: STABLE


Instructions:  General Headache (ED)


Forms:  [facility] (English)





- POA


Present On Arrival: None





- Clinical Impression


Clinical Impression: 


 Headache








- PA / NP / Resident Statement


MD/DO has reviewed & agrees with the documentation as recorded.





- Scribe Statement


The provider has reviewed the documentation as recorded by the Scribdanyell Engel





All medical record entries made by the Humberto were at my direction and 

personally dictated by me. I have reviewed the chart and agree that the record 

accurately reflects my personal performance of the history, physical exam, 

medical decision making, and the department course for this patient. I have 

also personally directed, reviewed, and agree with the discharge instructions 

and disposition.

## 2017-11-23 ENCOUNTER — HOSPITAL ENCOUNTER (EMERGENCY)
Dept: HOSPITAL 31 - C.ER | Age: 57
Discharge: HOME | End: 2017-11-23
Payer: COMMERCIAL

## 2017-11-23 VITALS — RESPIRATION RATE: 18 BRPM

## 2017-11-23 VITALS — BODY MASS INDEX: 23.3 KG/M2

## 2017-11-23 VITALS — TEMPERATURE: 97.4 F | SYSTOLIC BLOOD PRESSURE: 157 MMHG | HEART RATE: 80 BPM | DIASTOLIC BLOOD PRESSURE: 106 MMHG

## 2017-11-23 VITALS — OXYGEN SATURATION: 100 %

## 2017-11-23 DIAGNOSIS — K02.9: Primary | ICD-10-CM

## 2017-11-23 NOTE — C.PDOC
History Of Present Illness


57 year old female presents to the ED c/o dental pain for the past 3 weeks. 

Patient states the doctor she used to go to stopped taking her insurance so she 

needs to find a new one. Patient was seen here before and was prescribed 

antibiotics and pain killers. 


Time Seen by Provider: 11/23/17 15:42


Chief Complaint (Nursing): Dental Pain


History Per: Patient


History/Exam Limitations: no limitations


Onset/Duration Of Symptoms: Days


Current Symptoms Are (Timing): Still Present


Quality: Positive for: "Pain"


Recent travel outside of the [place]: No


Additional History Per: Patient





Past Medical History


Reviewed: Historical Data, Nursing Documentation, Vital Signs


Vital Signs: 


 Last Vital Signs











Temp  97.4 F L  11/23/17 16:48


 


Pulse  80   11/23/17 16:48


 


Resp  18   11/23/17 16:48


 


BP  157/106 H  11/23/17 16:48


 


Pulse Ox  98   11/23/17 16:48














- Medical History


PMH: Diabetes (type II), HTN


   Denies: Hypercholesterolemia


Surgical History: No Surg Hx


Family History: States: Unknown Family Hx, CAD, Diabetes





- Social History


Hx Tobacco Use: No


Hx Alcohol Use: No


Hx Substance Use: No





- Immunization History


Hx Tetanus Toxoid Vaccination: No


Hx Influenza Vaccination: No


Hx Pneumococcal Vaccination: No





Review Of Systems


Constitutional: Negative for: Fever, Chills


ENT: Positive for: Mouth Pain, Mouth Swelling


Cardiovascular: Negative for: Chest Pain


Respiratory: Negative for: Cough, Shortness of Breath


Gastrointestinal: Negative for: Nausea, Vomiting, Abdominal Pain


Musculoskeletal: Negative for: Neck Pain


Neurological: Negative for: Weakness, Numbness





Physical Exam





- Physical Exam


Appears: Non-toxic, No Acute Distress


Skin: Normal Color, Warm, Dry


Head: Atraumatic, Normacephalic


Eye(s): bilateral: Normal Inspection, PERRL, EOMI


Ear(s): Bilateral: Normal


Nose: No Discharge


Oral Mucosa: Moist


Teeth: Tender To Palpation, Other (Right lower 3rd cracked)


Throat: Normal, No Erythema, No Exudate


Neck: Normal ROM, Supple


Chest: Symmetrical


Cardiovascular: Rhythm Regular, No Murmur


Respiratory: Normal Breath Sounds, No Rales, No Rhonchi, No Stridor, No Wheezing


Gastrointestinal/Abdominal: Soft, No Tenderness


Back: Normal Inspection, No CVA Tenderness


Extremity: Normal ROM, No Swelling


Neurological/Psych: Oriented x3, Normal Speech, Normal Cognition, Normal Motor


Gait: Steady





ED Course And Treatment


O2 Sat by Pulse Oximetry: 100 (On RA)


Pulse Ox Interpretation: Normal





Medical Decision Making


Medical Decision Making: 


Plan:


* Amoxicillin 500 mg PO given


* Motrin 650 mg PO


* Oxycodone 1 tab PO given





Disposition





- Disposition


Referrals: 


Alexi Pavon, STEVEN [Staff Provider] - 


Disposition: HOME/ ROUTINE


Disposition Time: 16:35


Condition: GOOD


Additional Instructions: 


Follow up with the dentist within 1-2 days. return if worsened. 


Prescriptions: 


Amoxicillin [Amoxil 500 mg Cap] 500 mg PO TID #29 cap


Ibuprofen [Motrin] 600 mg PO TID #21 tab


oxyCODONE/Acetaminophen [Percocet 5/325 mg Tab] 1 tab PO QID PRN #10 tab


 PRN Reason: Pain


Instructions:  Dental Caries (ED)


Forms:  [facility] (English)





- Clinical Impression


Clinical Impression: 


 Dental caries








- PA / NP / Resident Statement


MD/DO has reviewed & agrees with the documentation as recorded.





- Scribe Statement


The provider has reviewed the documentation as recorded by the Scribe





Marvin Purcell





All medical record entries made by the Scribe were at my direction and 

personally dictated by me. I have reviewed the chart and agree that the record 

accurately reflects my personal performance of the history, physical exam, 

medical decision making, and the department course for this patient. I have 

also personally directed, reviewed, and agree with the discharge instructions 

and disposition.

## 2018-02-02 ENCOUNTER — HOSPITAL ENCOUNTER (EMERGENCY)
Dept: HOSPITAL 31 - C.ER | Age: 58
Discharge: HOME | End: 2018-02-02
Payer: COMMERCIAL

## 2018-02-02 VITALS
SYSTOLIC BLOOD PRESSURE: 145 MMHG | DIASTOLIC BLOOD PRESSURE: 80 MMHG | TEMPERATURE: 98 F | HEART RATE: 80 BPM | RESPIRATION RATE: 16 BRPM

## 2018-02-02 VITALS — BODY MASS INDEX: 25 KG/M2

## 2018-02-02 VITALS — OXYGEN SATURATION: 97 %

## 2018-02-02 DIAGNOSIS — N83.209: ICD-10-CM

## 2018-02-02 DIAGNOSIS — R30.0: Primary | ICD-10-CM

## 2018-02-02 LAB
BILIRUB UR-MCNC: NEGATIVE MG/DL
GLUCOSE UR STRIP-MCNC: (no result) MG/DL
LEUKOCYTE ESTERASE UR-ACNC: (no result) LEU/UL
PH UR STRIP: 5 [PH] (ref 5–8)
PROT UR STRIP-MCNC: NEGATIVE MG/DL
RBC # UR STRIP: NEGATIVE /UL
SP GR UR STRIP: 1.02 (ref 1–1.03)
SQUAMOUS EPITHIAL: 1 /HPF (ref 0–5)
URINE NITRATE: NEGATIVE
UROBILINOGEN UR-MCNC: NORMAL MG/DL (ref 0.2–1)

## 2018-02-02 NOTE — C.PDOC
History Of Present Illness


Pt is a 56 yo who presents with c/o 2 m-3 months of vague lower abd pain.Pt had 

seen her GYN and was diagnosed with ovarian cysts.Pt was due to have them 

removed but her physician moved away and pt has not had follow up.Pt reports 

urinary frequency but no dysuria.Denies fever,no n/v or back pain


Chief Complaint (Nursing): Female Genitourinary


History Per: Patient


History/Exam Limitations: no limitations


Current Symptoms Are (Timing): Still Present


Context: Other (has been on and off for months)


Pain Scale Rating Of: 1


Location Of Pain/Discomfort: Suprapubic


Radiation Of Pain To:: None


Quality Of Discomfort: Cramping


Associated Symptoms: Urinary Symptoms.  denies: Fever, Chills, Nausea, Vomiting

, Loss Of Appetite, Back Pain, Chest Pain


Exacerbating Factors: Upright Position


Alleviating Factors: None


Recent travel outside of the [place]: No





Past Medical History


Vital Signs: 


 Last Vital Signs











Temp  98.0 F   02/02/18 19:00


 


Pulse  80   02/02/18 19:00


 


Resp  16   02/02/18 19:00


 


BP  145/80   02/02/18 19:00


 


Pulse Ox  97   02/02/18 19:30














- Medical History


PMH: Diabetes (type II), HTN


   Denies: Hypercholesterolemia


Family History: States: Unknown Family Hx, CAD, Diabetes





- Social History


Hx Tobacco Use: No


Hx Alcohol Use: No


Hx Substance Use: No





- Immunization History


Hx Tetanus Toxoid Vaccination: No


Hx Influenza Vaccination: No


Hx Pneumococcal Vaccination: No





Physical Exam





- Physical Exam


Appears: Well


Skin: Normal Color


Head: Atraumatic


Nose: Normal


Oral Mucosa: Moist


Tongue: Normal Appearing


Lips: Normal Appearing


Teeth: Normal Dentition


Gingiva: Normal Appearing


Neck: Normal


Chest: Symmetrical


Cardiovascular: Rhythm Regular


Respiratory: Normal Breath Sounds


Gastrointestinal/Abdominal: Normal Exam


Back: Normal Inspection, No CVA Tenderness


Neurological/Psych: Oriented x3, Normal Speech, Normal Cognition, Normal 

Cranial Nerves





ED Course And Treatment


O2 Sat by Pulse Oximetry: 97





Disposition





- Disposition


Referrals: 


Daisy Guerrero MD [Staff Provider] - 


Disposition: HOME/ ROUTINE


Disposition Time: 18:40


Condition: GOOD


Prescriptions: 


Phenazopyridine [Pyridium] 200 mg PO BID #6 tab


Instructions:  Ovarian Cyst (ED), Dysuria (ED)


Forms:  [facility] (English)





- Clinical Impression


Clinical Impression: 


 Dysuria, Ovarian cyst

## 2018-02-06 ENCOUNTER — HOSPITAL ENCOUNTER (EMERGENCY)
Dept: HOSPITAL 31 - C.ER | Age: 58
LOS: 1 days | Discharge: HOME | End: 2018-02-07
Payer: COMMERCIAL

## 2018-02-06 VITALS — BODY MASS INDEX: 25 KG/M2

## 2018-02-06 DIAGNOSIS — E11.65: ICD-10-CM

## 2018-02-06 DIAGNOSIS — D25.9: Primary | ICD-10-CM

## 2018-02-06 LAB
ALBUMIN SERPL-MCNC: 4 G/DL (ref 3.5–5)
ALBUMIN/GLOB SERPL: 1.2 {RATIO} (ref 1–2.1)
ALT SERPL-CCNC: 49 U/L (ref 9–52)
AST SERPL-CCNC: 26 U/L (ref 14–36)
BASOPHILS # BLD AUTO: 0.1 K/UL (ref 0–0.2)
BASOPHILS NFR BLD: 1.2 % (ref 0–2)
BILIRUB UR-MCNC: NEGATIVE MG/DL
BUN SERPL-MCNC: 21 MG/DL (ref 7–17)
CALCIUM SERPL-MCNC: 10.5 MG/DL (ref 8.6–10.4)
EOSINOPHIL # BLD AUTO: 0.2 K/UL (ref 0–0.7)
EOSINOPHIL NFR BLD: 3.9 % (ref 0–4)
ERYTHROCYTE [DISTWIDTH] IN BLOOD BY AUTOMATED COUNT: 12.7 % (ref 11.5–14.5)
GFR NON-AFRICAN AMERICAN: > 60
GLUCOSE UR STRIP-MCNC: (no result) MG/DL
HGB BLD-MCNC: 14.4 G/DL (ref 11–16)
LEUKOCYTE ESTERASE UR-ACNC: (no result) LEU/UL
LYMPHOCYTES # BLD AUTO: 2.2 K/UL (ref 1–4.3)
LYMPHOCYTES NFR BLD AUTO: 45.2 % (ref 20–40)
MCH RBC QN AUTO: 30.2 PG (ref 27–31)
MCHC RBC AUTO-ENTMCNC: 34.8 G/DL (ref 33–37)
MCV RBC AUTO: 86.7 FL (ref 81–99)
MONOCYTES # BLD: 0.3 K/UL (ref 0–0.8)
MONOCYTES NFR BLD: 6.1 % (ref 0–10)
NEUTROPHILS # BLD: 2.2 K/UL (ref 1.8–7)
NEUTROPHILS NFR BLD AUTO: 43.6 % (ref 50–75)
NRBC BLD AUTO-RTO: 0.2 % (ref 0–2)
PH UR STRIP: 5 [PH] (ref 5–8)
PLATELET # BLD: 193 K/UL (ref 130–400)
PMV BLD AUTO: 10.8 FL (ref 7.2–11.7)
PROT UR STRIP-MCNC: NEGATIVE MG/DL
RBC # BLD AUTO: 4.78 MIL/UL (ref 3.8–5.2)
RBC # UR STRIP: NEGATIVE /UL
SP GR UR STRIP: 1.02 (ref 1–1.03)
SQUAMOUS EPITHIAL: 2 /HPF (ref 0–5)
URINE NITRATE: NEGATIVE
UROBILINOGEN UR-MCNC: NORMAL MG/DL (ref 0.2–1)
WBC # BLD AUTO: 5 K/UL (ref 4.8–10.8)

## 2018-02-06 PROCEDURE — 76856 US EXAM PELVIC COMPLETE: CPT

## 2018-02-06 PROCEDURE — 82948 REAGENT STRIP/BLOOD GLUCOSE: CPT

## 2018-02-06 PROCEDURE — 87181 SC STD AGAR DILUTION PER AGT: CPT

## 2018-02-06 PROCEDURE — 76830 TRANSVAGINAL US NON-OB: CPT

## 2018-02-06 PROCEDURE — 80053 COMPREHEN METABOLIC PANEL: CPT

## 2018-02-06 PROCEDURE — 87086 URINE CULTURE/COLONY COUNT: CPT

## 2018-02-06 PROCEDURE — 87491 CHLMYD TRACH DNA AMP PROBE: CPT

## 2018-02-06 PROCEDURE — 85025 COMPLETE CBC W/AUTO DIFF WBC: CPT

## 2018-02-06 PROCEDURE — 81001 URINALYSIS AUTO W/SCOPE: CPT

## 2018-02-06 PROCEDURE — 99285 EMERGENCY DEPT VISIT HI MDM: CPT

## 2018-02-06 PROCEDURE — 87591 N.GONORRHOEAE DNA AMP PROB: CPT

## 2018-02-06 PROCEDURE — 96374 THER/PROPH/DIAG INJ IV PUSH: CPT

## 2018-02-06 PROCEDURE — 96361 HYDRATE IV INFUSION ADD-ON: CPT

## 2018-02-06 NOTE — US
HISTORY:

pelvic pain



COMPARISON:

Transvaginal pelvic ultrasound performed 11/6/17



TECHNIQUE:

Real-time transabdominal pelvic ultrasound was performed. In addition 

a transvaginal pelvic ultrasound was necessary to better depict 

pelvic anatomy. 



FINDINGS:



UTERUS:

Measures 5.6 x 3.6 x 3.6 cm. Retroverted. Numerous probable uterine 

fibroids. For example: 1.4 x 1.5 x 1.4 cm anterior uterine and 1.1 x 

0.9 x 1.1 cm posterior uterine submucosal fibroids and 0.7 x 0.6 x 

0.6 cm posterior uterine submucosal fibroid. 



ENDOMETRIUM:

Measures 4 mm in diameter.  



CERVIX:

No cervical abnormality identified.



RIGHT OVARY:

Measures 2.4 x 0.7 x 1.3 cm. Blood flow is demonstrated. 



LEFT OVARY:

Measures 2.1 x 0.8 x 1.8 cm. Blood flow is demonstrated. 



FREE FLUID:

No significant free fluid noted.



OTHER FINDINGS:

None. 



IMPRESSION:

Uterine fibroids as above.

## 2018-02-06 NOTE — C.PDOC
History Of Present Illness


56yo female, with history of diabetes, recently seen in this ED on 2/2 for 

pressure like sensation on her bladder and lower abdominal pain. Patient was 

referred to Dr. Guerrero, OB/GYN and presents today because she has not been 

able to follow up. She reports last fall, she was informed she had the pressure 

like sensation due to ovarian cysts. Patient was seeing Dr. Bang but could 

follow up either because he left his practice. She states the pain has worsened 

and is 7-8/10 but does not want any medication for her pain. She went to the 

clinic and was told to present to ER and she will be seen by Dr. Guerrero. 

Patient states she "wants it checked out before anything gets worse."


Time Seen by Provider: 02/06/18 16:17


Chief Complaint (Nursing): Female Genitourinary


History Per: Patient


History/Exam Limitations: no limitations


Onset/Duration Of Symptoms: Days


Current Symptoms Are (Timing): Still Present


Quality Of Discomfort: Pressure


Abnormal Vaginal Bleeding: No





Past Medical History


Reviewed: Historical Data, Nursing Documentation, Vital Signs


Vital Signs: 


 Last Vital Signs











Temp  97.9 F   02/06/18 20:07


 


Pulse  71   02/06/18 20:07


 


Resp  18   02/06/18 20:07


 


BP  129/76   02/06/18 20:07


 


Pulse Ox  97   02/06/18 20:07














- Medical History


PMH: Diabetes (type II), HTN


   Denies: Hypercholesterolemia


Family History: States: Unknown Family Hx, CAD, Diabetes





- Social History


Hx Tobacco Use: No


Hx Alcohol Use: No


Hx Substance Use: No





- Immunization History


Hx Tetanus Toxoid Vaccination: No


Hx Influenza Vaccination: No


Hx Pneumococcal Vaccination: No





Review Of Systems


Except As Marked, All Systems Reviewed And Found Negative.


Constitutional: Negative for: Fever, Chills


Gastrointestinal: Positive for: Abdominal Pain (pressure like sensation)


Genitourinary: Negative for: Dysuria, Frequency, Hematuria





Physical Exam





- Physical Exam


Appears: Non-toxic, No Acute Distress


Skin: Normal Color, Warm, Dry


Head: Normacephalic


Eye(s): bilateral: Normal Inspection


Neck: Normal ROM, Supple


Chest: Symmetrical


Cardiovascular: Rhythm Regular


Respiratory: Normal Breath Sounds


Gastrointestinal/Abdominal: Bowel Sounds, Soft, Tenderness (mild suprapubic 

tenderness)


Back: Normal Inspection, No CVA Tenderness


Neurological/Psych: Oriented x3, Normal Speech, Normal Cognition





ED Course And Treatment





- Laboratory Results


Result Diagrams: 


 02/06/18 22:38





 02/06/18 22:38


O2 Sat by Pulse Oximetry: 98 (RA)


Pulse Ox Interpretation: Normal





Medical Decision Making


Medical Decision Making: 


Impression: Plevic pain


Plan:


-- Motrin 600 mg PO


-- US Pelvis/Transvaginal


-- Chlamydia/GC RNA, TMA


-- Urinalysis


-- Urine Culture





patient resting comfortable. Repeat blood sugar is 118. Will discharge home to 

follow up with gyn doctor on call


ultrasound demonstrates uterine fibroid. 





Disposition


Counseled Patient/Family Regarding: Studies Performed, Diagnosis, Need For 

Followup, Rx Given





- Disposition


Referrals: 


Trent Canales MD [Staff Provider] - 


Disposition: HOME/ ROUTINE


Disposition Time: 00:06


Condition: STABLE


Additional Instructions: 


call tomorrow for follow up appointment


take medication as needed for pain


return to ER if symptoms worsens or progress 


Prescriptions: 


Naproxen [Naprosyn] 500 mg PO BID PRN #16 tab


 PRN Reason: Pain, Moderate (4-7)


Instructions:  Diabetic Hyperglycemia (ED), Uterine Fibroids (ED)


Forms:  [facility] (English), General Discharge Instructions





- Clinical Impression


Clinical Impression: 


 Hyperglycemia, Fibroid








- Scribe Statement


The provider has reviewed the documentation as recorded by the Scribe (Gabrielle Mccormack)


Provider Attestation: 


All medical record entries made by the Scribe were at my direction and 

personally dictated by me. I have reviewed the chart and agree that the record 

accurately reflects my personal performance of the history, physical exam, 

medical decision making, and the department course for this patient. I have 

also personally directed, reviewed, and agree with the discharge instructions 

and disposition.

## 2018-02-07 VITALS — OXYGEN SATURATION: 98 %

## 2018-02-07 VITALS
HEART RATE: 82 BPM | DIASTOLIC BLOOD PRESSURE: 71 MMHG | RESPIRATION RATE: 20 BRPM | SYSTOLIC BLOOD PRESSURE: 132 MMHG | TEMPERATURE: 98.1 F

## 2018-02-13 ENCOUNTER — HOSPITAL ENCOUNTER (EMERGENCY)
Dept: HOSPITAL 31 - C.ER | Age: 58
LOS: 1 days | Discharge: HOME | End: 2018-02-14
Payer: COMMERCIAL

## 2018-02-13 VITALS
DIASTOLIC BLOOD PRESSURE: 85 MMHG | OXYGEN SATURATION: 97 % | RESPIRATION RATE: 20 BRPM | HEART RATE: 90 BPM | SYSTOLIC BLOOD PRESSURE: 137 MMHG

## 2018-02-13 VITALS — BODY MASS INDEX: 25 KG/M2

## 2018-02-13 VITALS — TEMPERATURE: 98.5 F

## 2018-02-13 DIAGNOSIS — E11.65: Primary | ICD-10-CM

## 2018-02-13 DIAGNOSIS — I10: ICD-10-CM

## 2018-02-13 LAB
ALBUMIN SERPL-MCNC: 3.9 G/DL (ref 3.5–5)
ALBUMIN/GLOB SERPL: 1.2 {RATIO} (ref 1–2.1)
ALT SERPL-CCNC: 66 U/L (ref 9–52)
AST SERPL-CCNC: 37 U/L (ref 14–36)
BASOPHILS # BLD AUTO: 0 K/UL (ref 0–0.2)
BASOPHILS NFR BLD: 0.5 % (ref 0–2)
BILIRUB UR-MCNC: NEGATIVE MG/DL
BUN SERPL-MCNC: 12 MG/DL (ref 7–17)
CALCIUM SERPL-MCNC: 10 MG/DL (ref 8.6–10.4)
EOSINOPHIL # BLD AUTO: 0.3 K/UL (ref 0–0.7)
EOSINOPHIL NFR BLD: 6.7 % (ref 0–4)
ERYTHROCYTE [DISTWIDTH] IN BLOOD BY AUTOMATED COUNT: 12.9 % (ref 11.5–14.5)
GFR NON-AFRICAN AMERICAN: > 60
GLUCOSE UR STRIP-MCNC: (no result) MG/DL
HGB BLD-MCNC: 13.8 G/DL (ref 11–16)
LEUKOCYTE ESTERASE UR-ACNC: (no result) LEU/UL
LYMPHOCYTES # BLD AUTO: 2.4 K/UL (ref 1–4.3)
LYMPHOCYTES NFR BLD AUTO: 49 % (ref 20–40)
MAGNESIUM SERPL-MCNC: 1.5 MG/DL (ref 1.6–2.3)
MCH RBC QN AUTO: 29.9 PG (ref 27–31)
MCHC RBC AUTO-ENTMCNC: 34.5 G/DL (ref 33–37)
MCV RBC AUTO: 86.7 FL (ref 81–99)
MONOCYTES # BLD: 0.3 K/UL (ref 0–0.8)
MONOCYTES NFR BLD: 6.8 % (ref 0–10)
NEUTROPHILS # BLD: 1.8 K/UL (ref 1.8–7)
NEUTROPHILS NFR BLD AUTO: 37 % (ref 50–75)
NRBC BLD AUTO-RTO: 0.2 % (ref 0–2)
PH UR STRIP: 5 [PH] (ref 5–8)
PLATELET # BLD: 199 K/UL (ref 130–400)
PMV BLD AUTO: 10.6 FL (ref 7.2–11.7)
PROT UR STRIP-MCNC: NEGATIVE MG/DL
RBC # BLD AUTO: 4.62 MIL/UL (ref 3.8–5.2)
RBC # UR STRIP: (no result) /UL
SP GR UR STRIP: 1.01 (ref 1–1.03)
SQUAMOUS EPITHIAL: 4 /HPF (ref 0–5)
URINE NITRATE: NEGATIVE
UROBILINOGEN UR-MCNC: NORMAL MG/DL (ref 0.2–1)
WBC # BLD AUTO: 4.8 K/UL (ref 4.8–10.8)

## 2018-02-13 PROCEDURE — 99285 EMERGENCY DEPT VISIT HI MDM: CPT

## 2018-02-13 PROCEDURE — 80053 COMPREHEN METABOLIC PANEL: CPT

## 2018-02-13 PROCEDURE — 84100 ASSAY OF PHOSPHORUS: CPT

## 2018-02-13 PROCEDURE — 83036 HEMOGLOBIN GLYCOSYLATED A1C: CPT

## 2018-02-13 PROCEDURE — 87086 URINE CULTURE/COLONY COUNT: CPT

## 2018-02-13 PROCEDURE — 83735 ASSAY OF MAGNESIUM: CPT

## 2018-02-13 PROCEDURE — 85025 COMPLETE CBC W/AUTO DIFF WBC: CPT

## 2018-02-13 PROCEDURE — 96361 HYDRATE IV INFUSION ADD-ON: CPT

## 2018-02-13 PROCEDURE — 81001 URINALYSIS AUTO W/SCOPE: CPT

## 2018-02-13 PROCEDURE — 96365 THER/PROPH/DIAG IV INF INIT: CPT

## 2018-02-13 NOTE — C.PDOC
Time Seen by Provider: 02/13/18 20:14


Chief Complaint (Nursing): High Blood Sugar


History Per: Patient


Onset/Duration Of Symptoms: Days


Current Symptoms Are (Timing): Still Present


Severity: Moderate


Current Diabetic Medications: Oral Medication


Associated Infectious Symptoms: Urinary Frequency, Nausea


Additional History Per: Prior Records





Past Medical History


Reviewed: Historical Data, Nursing Documentation, Vital Signs


Vital Signs: 





 Last Vital Signs











Temp  98.5 F   02/13/18 20:00


 


Pulse  90   02/13/18 23:07


 


Resp  20   02/13/18 23:07


 


BP  137/85   02/13/18 23:07


 


Pulse Ox  97   02/13/18 23:07














- Medical History


PMH: Diabetes (type II), HTN


Other Surgeries: Myomectomy


Family History: States: Unknown Family Hx, CAD, Diabetes





- Social History


Hx Tobacco Use: No


Hx Alcohol Use: No


Hx Substance Use: No





- Immunization History


Hx Tetanus Toxoid Vaccination: No


Hx Influenza Vaccination: No


Hx Pneumococcal Vaccination: No





Review Of Systems


Except As Marked, All Systems Reviewed And Found Negative.


Constitutional: Negative for: Fever


ENT: Negative for: Throat Pain


Cardiovascular: Negative for: Chest Pain


Respiratory: Negative for: Cough, Shortness of Breath


Gastrointestinal: Negative for: Vomiting, Abdominal Pain, Diarrhea


Genitourinary: Positive for: Frequency


Musculoskeletal: Positive for: Back Pain.  Negative for: Neck Pain


Skin: Negative for: Rash


Neurological: Negative for: Weakness, Numbness





Physical Exam





- Physical Exam


Appears: Non-toxic, No Acute Distress


Skin: Normal Color, Warm, Dry, No Rash


Head: Atraumatic, Normacephalic


Eye(s): bilateral: Normal Inspection, PERRL, EOMI


Neck: Normal ROM, Supple


Cardiovascular: Rhythm Regular


Respiratory: Normal Breath Sounds, No Accessory Muscle Use


Gastrointestinal/Abdominal: Soft, No Tenderness, No Distention


Back: No CVA Tenderness


Extremity: Normal ROM


Neurological/Psych: Oriented x3, Normal Motor, Normal Sensation





ED Course And Treatment





- Laboratory Results


Result Diagrams: 


 02/13/18 21:51





 02/13/18 21:51


Interpretation Of Abnormal: Hyperglycemia


O2 Sat by Pulse Oximetry: 97


Pulse Ox Interpretation: Normal





Progress





- Interventions


Interventions:: Observation, Intravenous fluid





- Data Reviewed


Data Reviewed: Lab, Old records





- Patient Status


Patient status: Mostly improved





- Continuity of Care


Discussed patient case with:: Patient, ED Nurse





- Patient Plan


Patient Plan: Discharge, F/U with PCP





Medical Decision Making


Medical Decision Making: 


Will increase Metformin from 1000mg qd to 1000mg bid.





Disposition


Counseled Patient/Family Regarding: Studies Performed, Diagnosis, Need For 

Followup, Rx Given





- Disposition


Disposition: HOME/ ROUTINE


Disposition Time: 23:32


Condition: IMPROVED


Additional Instructions: 


Follow up with your primary doctor this week for further evaluation and 

treatment. Return to the ER if you develop worsening of symptoms or if you have 

any other concerns. 


Prescriptions: 


MetFORMIN [glucoPHAGE] 1,000 mg PO BID #60 tab


Instructions:  Diabetic Hyperglycemia (ED)


Forms:  [facility] (English)





- Clinical Impression


Clinical Impression: 


 Uncontrolled diabetes mellitus

## 2018-03-01 ENCOUNTER — HOSPITAL ENCOUNTER (EMERGENCY)
Dept: HOSPITAL 31 - C.ER | Age: 58
Discharge: HOME | End: 2018-03-01
Payer: COMMERCIAL

## 2018-03-01 VITALS
TEMPERATURE: 98.5 F | SYSTOLIC BLOOD PRESSURE: 160 MMHG | DIASTOLIC BLOOD PRESSURE: 92 MMHG | OXYGEN SATURATION: 98 % | RESPIRATION RATE: 16 BRPM | HEART RATE: 86 BPM

## 2018-03-01 VITALS — BODY MASS INDEX: 25 KG/M2

## 2018-03-01 DIAGNOSIS — L02.212: Primary | ICD-10-CM

## 2018-03-01 NOTE — C.PDOC
History Of Present Illness





56 y/o female c/o painful lump to lower back x 2 days. hx dm, no other similar 

episodes. denies drainage, denies fever. 


Time Seen by Provider: 03/01/18 14:27


Chief Complaint (Nursing): Abnormal Skin Integrity


History Per: Patient


History/Exam Limitations: no limitations


Onset/Duration Of Symptoms: Days (2)


Current Symptoms Are (Timing): Still Present


Location Of Injury: Posterior: Back (lower)


Quality Of Symptoms: Painful, Swollen


Severity: Mild





Past Medical History


Reviewed: Historical Data, Nursing Documentation, Vital Signs


Vital Signs: 


 Last Vital Signs











Temp  98.5 F   03/01/18 13:10


 


Pulse  86   03/01/18 13:10


 


Resp  16   03/01/18 13:10


 


BP  160/92 H  03/01/18 13:10


 


Pulse Ox  98   03/03/18 19:25














- Medical History


PMH: Diabetes (type II), HTN


   Denies: Hypercholesterolemia


Family History: States: Unknown Family Hx, CAD, Diabetes





- Social History


Hx Tobacco Use: No


Hx Alcohol Use: No


Hx Substance Use: No





- Immunization History


Hx Tetanus Toxoid Vaccination: No


Hx Influenza Vaccination: No


Hx Pneumococcal Vaccination: No





Review Of Systems


Constitutional: Negative for: Fever, Chills


Musculoskeletal: Positive for: Back Pain (at site of l'lump')


Skin: Positive for: Other





Physical Exam





- Physical Exam


Appears: Non-toxic, No Acute Distress


Skin: Warm, Dry, Other (4 cm x 4 cm indurated area to mid lower back with mild 

erythema overlying approx 2 cm x 2 cm, with mild warmth, no fluctuance noted, 

tender to palpation)





ED Course And Treatment


O2 Sat by Pulse Oximetry: 98





Medical Decision Making


Medical Decision Making: 





pt with earluy abscess with overlying erythema, d/c home with mtorin. bactim 

and kleflex, return 2 days for wound check





Disposition


Counseled Patient/Family Regarding: Diagnosis, Need For Followup, Rx Given





- Disposition


Referrals: 


Amarilis Jernigan MD [Medical Doctor] - 


Disposition: HOME/ ROUTINE


Disposition Time: 14:42


Condition: GOOD


Additional Instructions: 


Apply warm compresses to painful area 4 times a day for 15 min at a time. Take 

antibiotics as prescribed.  Return to ER in 2 days for wound check. 


Prescriptions: 


Cephalexin [cephalexin] 500 mg PO QID #28 cap


Sulfamethoxazole/Trimethoprim [Bactrim  mg-160 mg] 1 tab PO BID #20 tab


Instructions:  Boil (DC)


Forms:  General Discharge Instructions, [facility] (English), Work Excuse





- Clinical Impression


Clinical Impression: 


 Abscess of lower back

## 2018-03-02 ENCOUNTER — HOSPITAL ENCOUNTER (EMERGENCY)
Dept: HOSPITAL 31 - C.ER | Age: 58
Discharge: HOME | End: 2018-03-02
Payer: COMMERCIAL

## 2018-03-02 VITALS — BODY MASS INDEX: 25 KG/M2

## 2018-03-02 VITALS — OXYGEN SATURATION: 99 %

## 2018-03-02 VITALS
HEART RATE: 79 BPM | SYSTOLIC BLOOD PRESSURE: 134 MMHG | DIASTOLIC BLOOD PRESSURE: 86 MMHG | RESPIRATION RATE: 18 BRPM | TEMPERATURE: 98.1 F

## 2018-03-02 DIAGNOSIS — I10: ICD-10-CM

## 2018-03-02 DIAGNOSIS — E11.9: ICD-10-CM

## 2018-03-02 DIAGNOSIS — G58.9: Primary | ICD-10-CM

## 2018-03-02 LAB
BASOPHILS # BLD AUTO: 0.1 K/UL (ref 0–0.2)
BASOPHILS NFR BLD: 1.2 % (ref 0–2)
BUN SERPL-MCNC: 7 MG/DL (ref 7–17)
CALCIUM SERPL-MCNC: 9.3 MG/DL (ref 8.6–10.4)
EOSINOPHIL # BLD AUTO: 0.2 K/UL (ref 0–0.7)
EOSINOPHIL NFR BLD: 2.2 % (ref 0–4)
ERYTHROCYTE [DISTWIDTH] IN BLOOD BY AUTOMATED COUNT: 13 % (ref 11.5–14.5)
GFR NON-AFRICAN AMERICAN: > 60
HGB BLD-MCNC: 13.7 G/DL (ref 11–16)
LYMPHOCYTES # BLD AUTO: 2.1 K/UL (ref 1–4.3)
LYMPHOCYTES NFR BLD AUTO: 27.6 % (ref 20–40)
MCH RBC QN AUTO: 30.4 PG (ref 27–31)
MCHC RBC AUTO-ENTMCNC: 35 G/DL (ref 33–37)
MCV RBC AUTO: 86.8 FL (ref 81–99)
MONOCYTES # BLD: 0.7 K/UL (ref 0–0.8)
MONOCYTES NFR BLD: 8.9 % (ref 0–10)
NEUTROPHILS # BLD: 4.5 K/UL (ref 1.8–7)
NEUTROPHILS NFR BLD AUTO: 60.1 % (ref 50–75)
NRBC BLD AUTO-RTO: 0.1 % (ref 0–2)
PLATELET # BLD: 246 K/UL (ref 130–400)
PMV BLD AUTO: 10.8 FL (ref 7.2–11.7)
RBC # BLD AUTO: 4.51 MIL/UL (ref 3.8–5.2)
WBC # BLD AUTO: 7.6 K/UL (ref 4.8–10.8)

## 2018-03-02 NOTE — C.PDOC
History Of Present Illness


58 Y/O FEMALE PRESENTS TO ED WITH COMPLAINTS OF NEW ONSET R HAND/FOREARM 

WEAKNESS SINCE 0200. PATIENT STATES SHE AWOKE W CURRENT SX, LAST FELT "NORMAL" @

11 PM. PATIENT REPORTS DIFFICULTY GRASPING, HOLDING W R HAND, +PARESTHESIA 

"GLOVE LIKE" R FOREARM TO HAND". NORMAL SENSATION AND STRENGTH UPPER ARM. NO 

KNOWN TRAUMA.PATIENT PREVIOUSLY EVALUATED AT ED 3/1 FOR EARLY ABSCESS, DC W 

BACTRIM AND KEFLEX. DENIES PRIOR DRUG REACTION TO SAME, HISTORY OF CERVICAL 

RADICULOPATHY, NECK PAIN OR ANY OTHER COMPLAINTS AT THIS TIME. 





EXAM


MILD DIST NONTOXIC


HEENT ATRAUM


NECK SUPPLE


EXT ATRAUM RUE: DECR  STENGTH, EXTENSION<FLEXION WRIST R COMPARED TO L; 

EQUAL STRENGTH B/L BICEP, SHOULDER


SKIN WNL


NEURO SEE NIH


REMAINDER NEG


Time Seen by Provider: 03/02/18 07:34


Chief Complaint (Nursing): Weakness/Neurological Deficit


History Per: Patient


History/Exam Limitations: no limitations


Onset/Duration Of Symptoms: Hrs


Current Symptoms Are (Timing): Still Present





Past Medical History


Reviewed: Historical Data, Nursing Documentation, Vital Signs


Vital Signs: 


 Last Vital Signs











Temp  98.5 F   03/02/18 07:23


 


Pulse  86   03/02/18 07:23


 


Resp  20   03/02/18 07:23


 


BP  135/85   03/02/18 07:23


 


Pulse Ox  99   03/02/18 08:30














- Medical History


PMH: Diabetes (type II), HTN


Surgical History: No Surg Hx


Family History: States: CAD, Diabetes





- Social History


Hx Tobacco Use: No


Hx Alcohol Use: No


Hx Substance Use: No





- Immunization History


Hx Tetanus Toxoid Vaccination: No


Hx Influenza Vaccination: No


Hx Pneumococcal Vaccination: No





Review Of Systems


Constitutional: Negative for: Fever, Chills


Cardiovascular: Negative for: Chest Pain


Gastrointestinal: Negative for: Nausea, Vomiting


Neurological: Positive for: Weakness, Numbness.  Negative for: Change in Speech





Physical Exam





- Physical Exam


Appears: Non-toxic, Other (In mild distress)


Skin: Warm, Dry, No Rash


Head: Atraumatic, Normacephalic


Eye(s): bilateral: Normal Inspection


Oral Mucosa: Moist


Throat: Normal, No Erythema, No Exudate


Cardiovascular: Rhythm Regular


Respiratory: Normal Breath Sounds, No Rales, No Rhonchi, No Wheezing


Extremity: No Deformity, Other (Extension<Flexion right wrist complates to left

; Equal strength bilaterally bicep and shoulder)


Extremity: Right: Atraumatic (Decreased  strength)


Pulses: Left Radial: Normal, Right Radial: Normal


Neurological/Psych: Oriented x3, Normal Speech, Normal Cognition, Normal 

Cranial Nerves, Normal Motor, No Normal Sensation





ED Course And Treatment





- Laboratory Results


Result Diagrams: 


 03/02/18 07:58





 03/02/18 07:58


O2 Sat by Pulse Oximetry: 99 (RA)


Pulse Ox Interpretation: Normal


Reevaluation Time: 08:43


Reassessment Condition: Improved (improved FINGER COORDINATION R HAND COMPARED 

TO INITIAL EXAM. PT OFFERED SPLINT FOR FUNCTIONAL SUPPORT, REFUSES. NEURO 

REFERRAL GIVEN.)





NIHSS Stroke Scale





- Date/Time Evaluation Performed


Date Performed: 03/02/18


Time Performed: 07:48


When Was NIHSS Performed: Baseline





- How Severe is the Stroke


Level of Consciousness: 0=Alert


LOC to Questions: 0=Both comments correct


LOC to commands: 0=Obeys both correctly


Best Gaze: 0=Normal


Visual: 0=No visual loss


Facial: 0=Normal


Motor Arm - Left: 0=No drift


Motor Arm - Right: 0=No drift


Motor Leg - Left: 0=No drift


Motor Leg - Right: 0=No drift


Limb Ataxia: 0=Absent


Sensory: 1=Mild to moderate loss


Best Language: 0=No aphasia


Dysarthia: 0=Normal articulation


Extinction & Inattention (Neglect): 0=Normal, no object


Score: 1





rTPA Inclusion/Exclusion





- Refusal of Treatment


Patient Refused Treatment: No





- Inclusion Criteria for Altepase


Patient is 18 years or Older: Yes


The Clinical Diagnosis of Ischemic Stroke That is Causing a Potentially 

Disabling Neurological Deficit: Yes


Time of Onset is Well Established to be Less Than 270 Minute Before Treatment 

Would Begin: Yes


Risk/Benefit Discussed With Patient/Family Member Present: Yes





- Exclusion Criteria for Altepase


Uncontrolled Hypertension at Time of Treatment (Systolic BP above 185 or 

Diastolic BP above 110 mmHg): No


Active Internal Bleeding: No


Known Bleeding Diathesis Including but Not Limited to: Platelets Below 100,000/

mm,PTT Above 40 sec After Heparin Use, Current Use of Oral Anitcoagulant With 

INR Greater Than 1.7 or PT Greater Than 15 secs: No


Evidence of an Intracranial Hemorrhage: No


Evidence of Major Acute Infarct With Signs Greater Than 1/3 MCA Territory: No


Suspicion of Subarachnoid Hemorrhage on Pretreatment Evaluation Even if CT Head 

Negative For Hemorrhage: No





- Warning to TPA With Conditions


Following Conditions Weighed Against Anticipated Benefit: Yes


Condition: Stroke Serevity Too Mild





Disposition


Counseled Patient/Family Regarding: Studies Performed, Diagnosis, Need For 

Followup





- Disposition


Referrals: 


Cristopher Goncalves MD [Staff Provider] - 


Disposition: HOME/ ROUTINE


Disposition Time: 08:44


Condition: IMPROVED


Additional Instructions: 


Radial Nerve Palsy





WHAT YOU NEED TO KNOW:





Radial nerve palsy is a condition that affects the radial nerve. The radial 

nerve starts in your upper arm and runs down to your wrist and fingers. It 

controls how your arm and hand move and feel. This condition may go away over 

time or you may always have it.





DISCHARGE INSTRUCTIONS:


Medicines:


Pain medicine: You may be given medicine to take away or decrease pain. Do not 

wait until the pain is severe before you take your medicine.


Take your medicine as directed. Contact your healthcare provider if you think 

your medicine is not helping or if you have side effects. Tell him of her if 

you are allergic to any medicine. Keep a list of the medicines, vitamins, and 

herbs you take. Include the amounts, and when and why you take them. Bring the 

list or the pill bottles to follow-up visits. Carry your medicine list with you 

in case of an emergency.


How to care for my splint or cast at home:


Elevate your arm: If you have a splint or cast on your arm, elevate it above 

the level of your heart to reduce swelling. The easiest way to do this is to 

prop your arm on pillows. Keep your arm elevated as much as possible for the 

first 2 to 3 days.


Ice your arm: If you have a splint or cast on your arm, ice may help reduce 

swelling. Use an ice pack or put ice in a plastic bag and wrap a towel around 

it to prevent leaking. Put the ice pack on your arm, over your splint or cast, 

as often as directed for the first 2 to 3 days.


Physical therapy:


Physical therapy helps you with special exercises. These exercises help make 

your bones and muscles strong and flexible.





Follow up with your healthcare provider or neurologist as directed:


Write down any questions you have so you remember to ask them in your follow-up 

visits.





Contact your healthcare provider if:


You have a fever.


You see redness, swelling, or pus around your wound.


You have questions or concerns about your condition.


Return to the emergency department if:


Your splint or cast is too tight or causes pain. The skin around your splint 

stings or burns.


Weakness and numbness in your arm and hand are worse after treatment.


Pain is worse after treatment, with swelling, burning, and changes in the color 

of the skin on the injured arm.


Forms:  [facility] (English), General Discharge Instructions





- Clinical Impression


Clinical Impression: 


 Nerve palsy








- Scribe Statement


The provider has reviewed the documentation as recorded by the Humberto Engel





All medical record entries made by the Steveibdanyell were at my direction and 

personally dictated by me. I have reviewed the chart and agree that the record 

accurately reflects my personal performance of the history, physical exam, 

medical decision making, and the department course for this patient. I have 

also personally directed, reviewed, and agree with the discharge instructions 

and disposition.

## 2018-03-02 NOTE — CT
PROCEDURE:  CT HEAD WITHOUT CONTRAST.



HISTORY:

r hand weakness, numbness



COMPARISON:

Noncontrast head CT performed 8/26/16 



TECHNIQUE:

Axial computed tomography images were obtained through the head/brain 

without intravenous contrast.  



Radiation dose:



Total exam DLP = 897.56 mGy-cm.



This CT exam was performed using one or more of the following dose 

reduction techniques: Automated exposure control, adjustment of the 

mA and/or kV according to patient size, and/or use of iterative 

reconstruction technique.



FINDINGS:



HEMORRHAGE:

No intracranial hemorrhage. 



BRAIN:

No mass effect or edema. Intracranial atherosclerosis. Borderline low 

lying cerebellar tonsils. The gray-white matter differentiation 

appears intact. Please note that MRI with diffusion imaging is more 

sensitive in the detection of acute ischemic event.



VENTRICLES:

No hydrocephalus. 



CALVARIUM:

Unremarkable.



PARANASAL SINUSES:

Unremarkable as visualized. No significant inflammatory changes.



MASTOID AIR CELLS:

Unremarkable as visualized. No inflammatory changes.



OTHER FINDINGS:

None.



IMPRESSION:

No acute intracranial pathology identified. Please note that MRI with 

diffusion imaging is more sensitive in the detection of acute 

ischemic event.



Borderline low lying cerebellar tonsils. 



Preliminary impression was provided by virtual radiologic.

## 2018-03-05 ENCOUNTER — HOSPITAL ENCOUNTER (EMERGENCY)
Dept: HOSPITAL 31 - C.ER | Age: 58
Discharge: HOME | End: 2018-03-05
Payer: COMMERCIAL

## 2018-03-05 VITALS
RESPIRATION RATE: 18 BRPM | SYSTOLIC BLOOD PRESSURE: 130 MMHG | TEMPERATURE: 99 F | HEART RATE: 88 BPM | DIASTOLIC BLOOD PRESSURE: 83 MMHG | OXYGEN SATURATION: 99 %

## 2018-03-05 VITALS — BODY MASS INDEX: 25 KG/M2

## 2018-03-05 DIAGNOSIS — L02.212: Primary | ICD-10-CM

## 2018-03-06 ENCOUNTER — HOSPITAL ENCOUNTER (EMERGENCY)
Dept: HOSPITAL 31 - C.ER | Age: 58
Discharge: HOME | End: 2018-03-06
Payer: COMMERCIAL

## 2018-03-06 VITALS
DIASTOLIC BLOOD PRESSURE: 89 MMHG | TEMPERATURE: 99.2 F | OXYGEN SATURATION: 98 % | RESPIRATION RATE: 18 BRPM | HEART RATE: 106 BPM | SYSTOLIC BLOOD PRESSURE: 149 MMHG

## 2018-03-06 VITALS — BODY MASS INDEX: 25 KG/M2

## 2018-03-06 DIAGNOSIS — I10: ICD-10-CM

## 2018-03-06 DIAGNOSIS — E11.9: ICD-10-CM

## 2018-03-06 DIAGNOSIS — L02.212: Primary | ICD-10-CM

## 2018-03-09 ENCOUNTER — HOSPITAL ENCOUNTER (EMERGENCY)
Dept: HOSPITAL 31 - C.ER | Age: 58
Discharge: HOME | End: 2018-03-09
Payer: COMMERCIAL

## 2018-03-09 VITALS
SYSTOLIC BLOOD PRESSURE: 134 MMHG | TEMPERATURE: 98.2 F | HEART RATE: 82 BPM | OXYGEN SATURATION: 99 % | RESPIRATION RATE: 18 BRPM | DIASTOLIC BLOOD PRESSURE: 86 MMHG

## 2018-03-09 VITALS — BODY MASS INDEX: 25 KG/M2

## 2018-03-09 DIAGNOSIS — E11.9: ICD-10-CM

## 2018-03-09 DIAGNOSIS — I10: ICD-10-CM

## 2018-03-09 DIAGNOSIS — Z51.89: Primary | ICD-10-CM

## 2018-03-09 DIAGNOSIS — L02.31: ICD-10-CM

## 2018-03-09 NOTE — C.PDOC
History Of Present Illness


57-year-old female presents to the emergency department for packing removal s/p 

I&D 2 days ago. Patient is taking her Clindamycin as prescribed. Seen multiple 

times in the last few days for same. Denies any fevers or chills. Patient 

reports she pulled out the packing herself on arrival to the ED.





Time Seen by Provider: 03/09/18 07:39


Chief Complaint (Nursing): Abnormal Skin Integrity


History Per: Patient


History/Exam Limitations: no limitations


Onset/Duration Of Symptoms: Days (x3)


Current Symptoms Are (Timing): Better


Quality Of Symptoms: Draining





Past Medical History


Reviewed: Historical Data, Nursing Documentation, Vital Signs


Vital Signs: 


 Last Vital Signs











Temp  98.2 F   03/09/18 07:20


 


Pulse  82   03/09/18 07:20


 


Resp  18   03/09/18 07:20


 


BP  134/86   03/09/18 07:20


 


Pulse Ox  99   03/09/18 07:59














- Medical History


PMH: Diabetes (type II), HTN


   Denies: Hypercholesterolemia


Family History: States: CAD, Diabetes





- Social History


Hx Tobacco Use: No


Hx Alcohol Use: No


Hx Substance Use: No





- Immunization History


Hx Tetanus Toxoid Vaccination: No


Hx Influenza Vaccination: No


Hx Pneumococcal Vaccination: No





Review Of Systems


Constitutional: Negative for: Fever, Chills


Skin: Positive for: Lesions (draining wound to left lower buttock/back)





Physical Exam





- Physical Exam


Appears: No Acute Distress


Skin: Warm, Dry, Other (Left lower back: Incised wound, able to express scant 

purulent drainage and blood. Some surrounding induration but no fluctuance)


Neurological/Psych: Oriented x3, Normal Speech





ED Course And Treatment


O2 Sat by Pulse Oximetry: 99 (RA)


Pulse Ox Interpretation: Normal





Medical Decision Making


Medical Decision Making: 





Impression: Visit for wound check





Patient is medically stable. Wound irrigated and sterile dressing applied. Pt 

advised to continue antibiotics and follow up with primary doctor next week for 

wound check.  





Disposition


Counseled Patient/Family Regarding: Diagnosis, Need For Followup





- Disposition


Referrals: 


Amarilis Jernigan MD [Medical Doctor] - 


Disposition: HOME/ ROUTINE


Disposition Time: 07:56


Condition: GOOD


Additional Instructions: 


Continue taking antibiotics until completed.  Remove dressing when showering, 

and wash area well with soap and water. Follow up with your doctor next week. 

Return to ER for fever, increased swelling or pain to incised area.  


Instructions:  Boil (DC)


Forms:  [facility] (English), General Discharge Instructions





- POA


Present On Arrival: None





- Clinical Impression


Clinical Impression: 


 Wound check, abscess








- PA / NP / Resident Statement


MD/DO has reviewed & agrees with the documentation as recorded.





- Scribe Statement


The provider has reviewed the documentation as recorded by the Scribe (Peri Barnes)





All medical record entries made by the Scribe were at my direction and 

personally dictated by me. I have reviewed the chart and agree that the record 

accurately reflects my personal performance of the history, physical exam, 

medical decision making, and the department course for this patient. I have 

also personally directed, reviewed, and agree with the discharge instructions 

and disposition.

## 2018-03-20 ENCOUNTER — HOSPITAL ENCOUNTER (EMERGENCY)
Dept: HOSPITAL 31 - C.ER | Age: 58
Discharge: HOME | End: 2018-03-20
Payer: COMMERCIAL

## 2018-03-20 VITALS
TEMPERATURE: 98.2 F | SYSTOLIC BLOOD PRESSURE: 118 MMHG | RESPIRATION RATE: 20 BRPM | DIASTOLIC BLOOD PRESSURE: 77 MMHG | HEART RATE: 85 BPM

## 2018-03-20 VITALS — BODY MASS INDEX: 25 KG/M2
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
## 2018-03-20 DIAGNOSIS — S31.000D: ICD-10-CM

## 2018-03-20 DIAGNOSIS — L03.312: Primary | ICD-10-CM

## 2018-03-20 LAB
BACTERIA #/AREA URNS HPF: (no result) /[HPF]
BASOPHILS # BLD AUTO: 0.1 K/UL (ref 0–0.2)
BASOPHILS NFR BLD: 0.8 % (ref 0–2)
BILIRUB UR-MCNC: NEGATIVE MG/DL
BUN SERPL-MCNC: 10 MG/DL (ref 7–17)
CALCIUM SERPL-MCNC: 10 MG/DL (ref 8.6–10.4)
EOSINOPHIL # BLD AUTO: 0.2 K/UL (ref 0–0.7)
EOSINOPHIL NFR BLD: 2.8 % (ref 0–4)
ERYTHROCYTE [DISTWIDTH] IN BLOOD BY AUTOMATED COUNT: 12.8 % (ref 11.5–14.5)
GFR NON-AFRICAN AMERICAN: > 60
GLUCOSE UR STRIP-MCNC: NORMAL MG/DL
HGB BLD-MCNC: 13.4 G/DL (ref 11–16)
LEUKOCYTE ESTERASE UR-ACNC: (no result) LEU/UL
LYMPHOCYTES # BLD AUTO: 2 K/UL (ref 1–4.3)
LYMPHOCYTES NFR BLD AUTO: 27.2 % (ref 20–40)
MCH RBC QN AUTO: 29.3 PG (ref 27–31)
MCHC RBC AUTO-ENTMCNC: 33.8 G/DL (ref 33–37)
MCV RBC AUTO: 86.5 FL (ref 81–99)
MONOCYTES # BLD: 0.6 K/UL (ref 0–0.8)
MONOCYTES NFR BLD: 8.6 % (ref 0–10)
NEUTROPHILS # BLD: 4.4 K/UL (ref 1.8–7)
NEUTROPHILS NFR BLD AUTO: 60.6 % (ref 50–75)
NRBC BLD AUTO-RTO: 0 % (ref 0–2)
PH UR STRIP: 5 [PH] (ref 5–8)
PLATELET # BLD: 292 K/UL (ref 130–400)
PMV BLD AUTO: 10.3 FL (ref 7.2–11.7)
PROT UR STRIP-MCNC: NEGATIVE MG/DL
RBC # BLD AUTO: 4.58 MIL/UL (ref 3.8–5.2)
RBC # UR STRIP: (no result) /UL
SP GR UR STRIP: 1.01 (ref 1–1.03)
SQUAMOUS EPITHIAL: 9 /HPF (ref 0–5)
UROBILINOGEN UR-MCNC: NORMAL MG/DL (ref 0.2–1)
WBC # BLD AUTO: 7.2 K/UL (ref 4.8–10.8)

## 2018-03-20 PROCEDURE — 96374 THER/PROPH/DIAG INJ IV PUSH: CPT

## 2018-03-20 PROCEDURE — 99284 EMERGENCY DEPT VISIT MOD MDM: CPT

## 2018-03-20 PROCEDURE — 87040 BLOOD CULTURE FOR BACTERIA: CPT

## 2018-03-20 PROCEDURE — 81001 URINALYSIS AUTO W/SCOPE: CPT

## 2018-03-20 PROCEDURE — 80048 BASIC METABOLIC PNL TOTAL CA: CPT

## 2018-03-20 PROCEDURE — 85025 COMPLETE CBC W/AUTO DIFF WBC: CPT

## 2018-03-20 NOTE — C.PDOC
History Of Present Illness


56yo female, presents to ED for re-evaluation of open wounds over her lower back

, which she gradually developed over the past few weeks. Patient states she has 

been on antibiotics multiple times with no significant improvement. Patient was 

evaluated by her PMD who advised her to come to ER for IV antibiotics. Otherwise

, pt denies fever, chills , sore throat abd. pain, V/D, UTi sx, denies saddle 

anesthesia, incontinence, denies weakness, sensory or vascular deficits to B/L 

LEs. Ambulate to Ed for evaluation, not in nay apparent distress.


Time Seen by Provider: 03/20/18 18:17


Chief Complaint (Nursing): Abnormal Skin Integrity


History Per: Patient


History/Exam Limitations: no limitations


Onset/Duration Of Symptoms: Persistent


Current Symptoms Are (Timing): Still Present


Location Of Injury: Posterior: Back


Quality Of Symptoms: Draining





Past Medical History


Reviewed: Historical Data, Nursing Documentation, Vital Signs


Vital Signs: 


 Last Vital Signs











Temp  98.2 F   03/20/18 20:25


 


Pulse  85   03/20/18 20:25


 


Resp  20   03/20/18 20:25


 


BP  118/77   03/20/18 20:25


 


Pulse Ox  99   03/20/18 20:25














- Medical History


PMH: Diabetes (type II)


   Denies: HTN (PT DENIES), Hypercholesterolemia


Surgical History: No Surg Hx


Family History: States: Unknown Family Hx, CAD, Diabetes





- Social History


Hx Tobacco Use: No


Hx Alcohol Use: No


Hx Substance Use: No





- Immunization History


Hx Tetanus Toxoid Vaccination: No


Hx Influenza Vaccination: No


Hx Pneumococcal Vaccination: No





Review Of Systems


Except As Marked, All Systems Reviewed And Found Negative.


Constitutional: Negative for: Fever, Chills, Malaise


ENT: Negative for: Throat Pain


Cardiovascular: Negative for: Chest Pain, Palpitations


Respiratory: Negative for: Cough, Shortness of Breath


Gastrointestinal: Negative for: Nausea, Vomiting, Abdominal Pain, Diarrhea


Genitourinary: Negative for: Dysuria


Musculoskeletal: Positive for: Back Pain.  Negative for: Neck Pain


Skin: Positive for: Lesions, Other (open wound over lower back)


Neurological: Negative for: Altered Mental Status, Headache, Dizziness





Physical Exam





- Physical Exam


Appears: Well, Non-toxic, No Acute Distress


Skin: Warm, Other (small open wound over lumbar spine with thick yellow 

discharge. Multiple single tender pustulas over lumbar paraspinal area. No 

fluctuance, no proximal streaking noted.)


Head: Normacephalic


Eye(s): bilateral: PERRL


Oral Mucosa: Moist


Throat: No Erythema, No Drooling


Neck: Trachea Midline, Supple


Chest: Symmetrical


Cardiovascular: Rhythm Regular


Respiratory: No Decreased Breath Sounds, No Accessory Muscle Use, No Rales, No 

Rhonchi, No Stridor, No Wheezing


Gastrointestinal/Abdominal: Soft, No Tenderness, No Distention, No Guarding


Back: No CVA Tenderness


Extremity: Normal ROM, No Deformity, No Swelling


Neurological/Psych: Oriented x3, Normal Speech, Normal Motor, Normal Sensation, 

Normal Reflexes





ED Course And Treatment





- Laboratory Results


Result Diagrams: 


 03/20/18 19:11





 03/20/18 19:11


Lab Interpretation: No Acute Changes


O2 Sat by Pulse Oximetry: 97 (RA)


Pulse Ox Interpretation: Normal


Progress Note: On re-evaluation, pt is afebrile, hemodynamicaly stable.  Non-

toxic.  Ambulatory in Ed with stable gait.  Abd: benign, (-) guarding, (-) 

rebound.  back: (-) CVA tenderness.  Neuorlogicaly intact.  Blood work review- 

normal, no leukocytosis, no left shift.  Pt has cinical findings c/w non-heaing 

small wound over lower back, multiple cystic lesion to paralumbar area r/o 

MRSA.  Pt advised.  ref. to f/u with PMD, ID in 2-3 days for re-evaluation.  

return to ED if any worsening or new changes.





Disposition


Counseled Patient/Family Regarding: Studies Performed, Diagnosis, Need For 

Followup, Rx Given





- Disposition


Referrals: 


Amarilis Jernigan MD [Medical Doctor] - 


Disposition: HOME/ ROUTINE


Disposition Time: 20:06


Condition: STABLE


Additional Instructions: 


WArm salty water compresses to area





Change antibiotic, and Take medication as prescribed





Follow up with PMD, Infection disease or Dermatology for further evaluation and 

treatment.


return to ED if any worsening or new changes.


Prescriptions: 


Clindamycin [Cleocin] 300 mg PO Q6 #28 cap


Instructions:  Cellulitis (Skin Infection), Adult (DC)


Forms:  [facility] (English)





- Clinical Impression


Clinical Impression: 


 Cellulitis, Open wound








- PA / NP / Resident Statement


MD/DO has reviewed & agrees with the documentation as recorded.





- Scribe Statement


The provider has reviewed the documentation as recorded by the Scribe (Gabrielle Mccormack)


Provider Attestation:


All medical record entries made by the Steveibe were at my direction and 

personally dictated by me. I have reviewed the chart and agree that the record 

accurately reflects my personal performance of the history, physical exam, 

medical decision making, and the department course for this patient. I have 

also personally directed, reviewed, and agree with the discharge instructions 

and disposition.

## 2018-03-22 VITALS — OXYGEN SATURATION: 97 %

## 2018-03-24 ENCOUNTER — HOSPITAL ENCOUNTER (EMERGENCY)
Dept: HOSPITAL 31 - C.ER | Age: 58
Discharge: HOME | End: 2018-03-24
Payer: COMMERCIAL

## 2018-03-24 VITALS
TEMPERATURE: 97.7 F | OXYGEN SATURATION: 98 % | SYSTOLIC BLOOD PRESSURE: 163 MMHG | RESPIRATION RATE: 18 BRPM | DIASTOLIC BLOOD PRESSURE: 87 MMHG | HEART RATE: 95 BPM

## 2018-03-24 VITALS — BODY MASS INDEX: 25 KG/M2

## 2018-03-24 DIAGNOSIS — Z48.00: Primary | ICD-10-CM

## 2018-03-24 NOTE — C.PDOC
History Of Present Illness


57yo undomiciled female, presents to ED for evaluation of pustules on her lower 

back. Patient is currently on her 2nd round of antibiotics which started on 3/

20 and was taking 6 tablets per day; states she only took 3 tablets today. Of 

note, this is the 7th evaluation for this complaint this month. 


Time Seen by Provider: 03/24/18 17:12


Chief Complaint (Nursing): Wound Check


History Per: Patient


History/Exam Limitations: no limitations


Current Symptoms Are (Timing): Still Present


Location Of Injury: Posterior: Back





Past Medical History


Reviewed: Historical Data, Nursing Documentation, Vital Signs


Vital Signs: 


 Last Vital Signs











Temp  97.7 F   03/24/18 17:05


 


Pulse  95 H  03/24/18 17:05


 


Resp  18   03/24/18 17:05


 


BP  163/87 H  03/24/18 17:05


 


Pulse Ox  98   03/24/18 19:16














- Medical History


PMH: Diabetes (type II)


   Denies: HTN (PT DENIES), Hypercholesterolemia


Surgical History: No Surg Hx


Family History: States: Unknown Family Hx, CAD, Diabetes





- Social History


Hx Tobacco Use: No


Hx Alcohol Use: No


Hx Substance Use: No





- Immunization History


Hx Tetanus Toxoid Vaccination: No


Hx Influenza Vaccination: No


Hx Pneumococcal Vaccination: No





Review Of Systems


Except As Marked, All Systems Reviewed And Found Negative.


Constitutional: Negative for: Fever, Chills


Skin: Positive for: Other (pustules on lower back)





Physical Exam





- Physical Exam


Appears: Non-toxic


Skin: Warm, Dry, Other (healing feruncles 10-20 noted in sacroiliac area, dried 

over and crusted. no erythema or discharge noted.)


Head: Atraumatic, Normacephalic


Eye(s): bilateral: Normal Inspection


Nose: Normal


Oral Mucosa: Moist


Neck: Normal ROM, Supple


Chest: Symmetrical


Cardiovascular: Rhythm Regular


Respiratory: Normal Breath Sounds





ED Course And Treatment


O2 Sat by Pulse Oximetry: 98 (RA)


Pulse Ox Interpretation: Normal





Medical Decision Making


Medical Decision Making: 





nicely healing formerly pustular wounds, now s/p 4 of 7 days of 2nd PO abx 

regimen





pt confused, believes she is to take the medicine "6 times a day" but was 

written 3/20 as Cleocin 300 mg Q6H, #28 (7 days of therapy)





Re-educated


Healing nicely


no further wound care.





Disposition


Doctor Will See Patient In The: Office


Counseled Patient/Family Regarding: Studies Performed, Diagnosis





- Disposition


Referrals: 


[facility] [Outside]


Disposition: HOME/ ROUTINE


Disposition Time: 17:19


Condition: GOOD


Additional Instructions: 


the antibiotic you are taking is Cleocin.





you should take this medicine FOUR TIMES A DAY 





TODAY IS DAY 4 OF 7 DAYS OF ANTIBIOTICS





YOU SHOULD HAVE ABOUT 3 DAYS OF ANTIBOTICS REMAINING.





TAKE THE ANTIBIOTICS UNTIL COMPLETED





WAS THE WOUND AREA WITH SOAP AND WATER DAILY AND APPLY A CLEAN DRY DRESSING. 





NO NEED FOR FURTHER FOLLOW-UP IF THE WOUNDS CONTINUE TO HEAL NICELY. 


Instructions:  Wound Care


Forms:  [facility] (English)





- Clinical Impression


Clinical Impression: 


 Visit for wound check








- PA / NP / Resident Statement


MD/DO has reviewed & agrees with the documentation as recorded.





- Scribe Statement


The provider has reviewed the documentation as recorded by the Scribe (Gabrielle Mccormack)


Provider Attestation: 


All medical record entries made by the Scribe were at my direction and 

personally dictated by me. I have reviewed the chart and agree that the record 

accurately reflects my personal performance of the history, physical exam, 

medical decision making, and the department course for this patient. I have 

also personally directed, reviewed, and agree with the discharge instructions 

and disposition.

## 2018-04-10 ENCOUNTER — HOSPITAL ENCOUNTER (EMERGENCY)
Dept: HOSPITAL 31 - C.ER | Age: 58
Discharge: HOME | End: 2018-04-10
Payer: COMMERCIAL

## 2018-04-10 VITALS
TEMPERATURE: 98.3 F | RESPIRATION RATE: 14 BRPM | OXYGEN SATURATION: 98 % | HEART RATE: 80 BPM | SYSTOLIC BLOOD PRESSURE: 136 MMHG | DIASTOLIC BLOOD PRESSURE: 86 MMHG

## 2018-04-10 VITALS — BODY MASS INDEX: 25 KG/M2

## 2018-04-10 DIAGNOSIS — H02.89: Primary | ICD-10-CM

## 2018-04-10 NOTE — C.PDOC
History Of Present Illness


59 y/o female presents to ED with complaints of right eyelid swelling and 

discomfort since this afternoon. Patient states she took out contacts but no 

improvement and denies injury, foreign body sensation, blurriness, eye pain or 

any other complaints at this time. 


Time Seen by Provider: 04/10/18 18:26


Chief Complaint (Nursing): Eye Problem


History Per: Patient


History/Exam Limitations: no limitations


Onset/Duration Of Symptoms: Hrs


Current Symptoms Are (Timing): Still Present





Past Medical History


Reviewed: Historical Data, Nursing Documentation, Vital Signs


Vital Signs: 


 Last Vital Signs











Temp  98.3 F   04/10/18 18:09


 


Pulse  80   04/10/18 18:09


 


Resp  14   04/10/18 18:09


 


BP  136/86   04/10/18 18:09


 


Pulse Ox  98   04/13/18 21:57














- Medical History


PMH: Diabetes (type II)


Surgical History: No Surg Hx


Family History: States: CAD, Diabetes





- Social History


Hx Tobacco Use: No


Hx Alcohol Use: No


Hx Substance Use: No





- Immunization History


Hx Tetanus Toxoid Vaccination: No


Hx Influenza Vaccination: No


Hx Pneumococcal Vaccination: No





Review Of Systems


Constitutional: Negative for: Fever, Chills


Eyes: Positive for: Pain (to eyelid), Eyelid Inflammation.  Negative for: 

Vision Change


ENT: Negative for: Ear Pain, Ear Discharge


Cardiovascular: Negative for: Chest Pain


Respiratory: Negative for: Shortness of Breath


Skin: Negative for: Rash





Physical Exam





- Physical Exam


Appears: Non-toxic, No Acute Distress


Skin: Warm, Dry, No Rash


Head: Atraumatic, Normacephalic


Eye(s): bilateral: PERRL, EOMI, Other (No discharge, Mild injection to right eye

), right: Eyelid Inflammation


Oral Mucosa: Moist


Throat: Normal, No Erythema, No Exudate


Neck: Normal ROM, Supple


Cardiovascular: Rhythm Regular


Respiratory: Normal Breath Sounds, No Rales, No Rhonchi, No Wheezing


Extremity: Normal ROM, Capillary Refill (<2 seconds)


Neurological/Psych: Oriented x3, Normal Speech





ED Course And Treatment


O2 Sat by Pulse Oximetry: 98 (RA)


Pulse Ox Interpretation: Normal





Medical Decision Making


Medical Decision Making: 





visual acuity checked by me; pt has 20/200 in right eye with no corrective 

contact in and 20/70 with  corrective contact lens in left eye.  pt with eye 

lid swelling only to right, no eye pain; will d/c with cold compress and optho 

tomorrow. 





Disposition


Counseled Patient/Family Regarding: Studies Performed, Diagnosis, Need For 

Followup





- Disposition


Referrals: 


Michelet Byrnes [Staff Provider] - 


Disposition: HOME/ ROUTINE


Disposition Time: 20:05


Condition: GOOD


Additional Instructions: 


Cold compress to right eyelid.  Follow up with Dr Byrnes tomorrow.  Return to ER 

for any worse symptoms. 


Forms:  [facility] (English), General Discharge Instructions





- Clinical Impression


Clinical Impression: 


 Swollen eyelid








- PA / NP / Resident Statement


MD/DO has reviewed & agrees with the documentation as recorded.





- Scribe Statement


The provider has reviewed the documentation as recorded by the Steveibdanyell Engel





All medical record entries made by the Humberto were at my direction and 

personally dictated by me. I have reviewed the chart and agree that the record 

accurately reflects my personal performance of the history, physical exam, 

medical decision making, and the department course for this patient. I have 

also personally directed, reviewed, and agree with the discharge instructions 

and disposition.

## 2018-05-15 ENCOUNTER — HOSPITAL ENCOUNTER (EMERGENCY)
Dept: HOSPITAL 31 - C.ER | Age: 58
Discharge: HOME | End: 2018-05-15
Payer: COMMERCIAL

## 2018-05-15 VITALS — RESPIRATION RATE: 18 BRPM | SYSTOLIC BLOOD PRESSURE: 165 MMHG | HEART RATE: 67 BPM | DIASTOLIC BLOOD PRESSURE: 95 MMHG

## 2018-05-15 VITALS — TEMPERATURE: 98.1 F

## 2018-05-15 VITALS — BODY MASS INDEX: 25 KG/M2

## 2018-05-15 VITALS — OXYGEN SATURATION: 99 %

## 2018-05-15 DIAGNOSIS — R53.83: ICD-10-CM

## 2018-05-15 DIAGNOSIS — E87.6: ICD-10-CM

## 2018-05-15 DIAGNOSIS — R51: Primary | ICD-10-CM

## 2018-05-15 LAB
ALBUMIN SERPL-MCNC: 3.8 G/DL (ref 3.5–5)
ALBUMIN/GLOB SERPL: 1.1 {RATIO} (ref 1–2.1)
ALT SERPL-CCNC: 62 U/L (ref 9–52)
AST SERPL-CCNC: 39 U/L (ref 14–36)
BASOPHILS # BLD AUTO: 0.1 K/UL (ref 0–0.2)
BASOPHILS NFR BLD: 2.6 % (ref 0–2)
BILIRUB UR-MCNC: NEGATIVE MG/DL
BUN SERPL-MCNC: 12 MG/DL (ref 7–17)
CALCIUM SERPL-MCNC: 9.7 MG/DL (ref 8.6–10.4)
EOSINOPHIL # BLD AUTO: 0.3 K/UL (ref 0–0.7)
EOSINOPHIL NFR BLD: 7.6 % (ref 0–4)
ERYTHROCYTE [DISTWIDTH] IN BLOOD BY AUTOMATED COUNT: 14.5 % (ref 11.5–14.5)
GFR NON-AFRICAN AMERICAN: > 60
GLUCOSE UR STRIP-MCNC: (no result) MG/DL
HGB BLD-MCNC: 13 G/DL (ref 11–16)
LEUKOCYTE ESTERASE UR-ACNC: (no result) LEU/UL
LYMPHOCYTES # BLD AUTO: 2.2 K/UL (ref 1–4.3)
LYMPHOCYTES NFR BLD AUTO: 48.6 % (ref 20–40)
MCH RBC QN AUTO: 29.9 PG (ref 27–31)
MCHC RBC AUTO-ENTMCNC: 34.2 G/DL (ref 33–37)
MCV RBC AUTO: 87.3 FL (ref 81–99)
MONOCYTES # BLD: 0.4 K/UL (ref 0–0.8)
MONOCYTES NFR BLD: 9.4 % (ref 0–10)
NEUTROPHILS # BLD: 1.5 K/UL (ref 1.8–7)
NEUTROPHILS NFR BLD AUTO: 31.8 % (ref 50–75)
NRBC BLD AUTO-RTO: 0.1 % (ref 0–2)
PH UR STRIP: 6 [PH] (ref 5–8)
PLATELET # BLD: 211 K/UL (ref 130–400)
PMV BLD AUTO: 9.8 FL (ref 7.2–11.7)
PROT UR STRIP-MCNC: NEGATIVE MG/DL
RBC # BLD AUTO: 4.36 MIL/UL (ref 3.8–5.2)
RBC # UR STRIP: NEGATIVE /UL
SP GR UR STRIP: 1.01 (ref 1–1.03)
SQUAMOUS EPITHIAL: 4 /HPF (ref 0–5)
UROBILINOGEN UR-MCNC: NORMAL MG/DL (ref 0.2–1)
WBC # BLD AUTO: 4.6 K/UL (ref 4.8–10.8)

## 2018-05-15 PROCEDURE — 85025 COMPLETE CBC W/AUTO DIFF WBC: CPT

## 2018-05-15 PROCEDURE — 96374 THER/PROPH/DIAG INJ IV PUSH: CPT

## 2018-05-15 PROCEDURE — 80053 COMPREHEN METABOLIC PANEL: CPT

## 2018-05-15 PROCEDURE — 96361 HYDRATE IV INFUSION ADD-ON: CPT

## 2018-05-15 PROCEDURE — 99284 EMERGENCY DEPT VISIT MOD MDM: CPT

## 2018-05-15 PROCEDURE — 81001 URINALYSIS AUTO W/SCOPE: CPT

## 2018-05-15 NOTE — C.PDOC
History Of Present Illness


57y/o female, presents to ED with complaints of right sided headache with 

nausea for the past week and associated fatigue. Patient states she was taking 

a CPR course and was learning about strokes and felt her symptoms were similar 

so she wanted to be "checked out." She also has a secondary complaints of right 

shoulder and left leg pain. She denies any vision changes, numbness, weakness, 

and offers no other medical complaints.





PMD: Amarilis Jernigan


Time Seen by Provider: 05/15/18 15:39


Chief Complaint (Nursing): Headache


History Per: Patient


History/Exam Limitations: no limitations


Onset/Duration Of Symptoms: Intermittent Episodes


Current Symptoms Are (Timing): Still Present


Preceeding Symptoms: denies: Visual Disturbances


Associated Symptoms: denies: Extremity Weakness





Past Medical History


Reviewed: Historical Data, Nursing Documentation, Vital Signs


Vital Signs: 


 Last Vital Signs











Temp  98.1 F   05/15/18 15:24


 


Pulse  67   05/15/18 18:18


 


Resp  18   05/15/18 18:18


 


BP  165/95 H  05/15/18 18:18


 


Pulse Ox  98   05/15/18 18:18














- Medical History


PMH: Diabetes (type II)


   Denies: HTN (PT DENIES), Hypercholesterolemia


Surgical History: No Surg Hx


Family History: States: Unknown Family Hx, CAD, Diabetes





- Social History


Hx Tobacco Use: No


Hx Alcohol Use: No


Hx Substance Use: No





- Immunization History


Hx Tetanus Toxoid Vaccination: No


Hx Influenza Vaccination: No


Hx Pneumococcal Vaccination: No





Review Of Systems


Except As Marked, All Systems Reviewed And Found Negative.


Constitutional: Negative for: Fever, Chills


Eyes: Negative for: Vision Change


Gastrointestinal: Positive for: Nausea


Musculoskeletal: Positive for: Shoulder Pain (right), Leg Pain (left)


Neurological: Positive for: Headache.  Negative for: Weakness, Numbness





Physical Exam





- Physical Exam


Appears: Non-toxic, No Acute Distress


Skin: Normal Color, Warm, Dry


Head: Atraumatic, Normacephalic


Eye(s): bilateral: Normal Inspection, PERRL, EOMI


Ear(s): Bilateral: Normal


Nose: Normal


Oral Mucosa: Moist


Neck: Normal, Supple


Chest: Symmetrical


Cardiovascular: Rhythm Regular


Respiratory: Normal Breath Sounds


Back: No Normal Inspection, No Vertebral Tenderness, No Paraspinal Tenderness


Extremity: Normal ROM, No Tenderness


Neurological/Psych: Oriented x3, Normal Speech, Normal Motor, Normal Sensation





ED Course And Treatment





- Laboratory Results


Result Diagrams: 


 05/15/18 16:22





 05/15/18 16:22


O2 Sat by Pulse Oximetry: 99 (RA)


Pulse Ox Interpretation: Normal





Medical Decision Making


Medical Decision Making: 


Impression: Headache, musculoskeletal pain


Plan:


-- Reglan 10mg IV


-- IV Fluids


-- Labs


-- Urinalysis





Prior records reviewed and patient has had multiple ER visits with vague 

complaints. Last blood work was done on 3/20 with values within normal limits.





Progress:


Labs reviewed and shows low potassium levels. Patient given KCl 20meq PO


On re-examination, patient is resting comfortably in no acute distress. Patient 

reports improvement of symptoms. Patient feels comfortable going home and will 

be discharged. Patient given follow up instructions. Instructed to return to ER 

if symptoms worsen or new symptoms arise.





Disposition


Counseled Patient/Family Regarding: Studies Performed, Diagnosis, Need For 

Followup





- Disposition


Referrals: 


Amarilis Jernigan MD [Medical Doctor] - 


Disposition: HOME/ ROUTINE


Disposition Time: 17:30


Condition: STABLE


Additional Instructions: 


Follow up with your primary medical doctor or clinic in 2-5 days for further 

evaluation. Be sure to diabetic diet and eat more foods with potassium. Return 

to the emergency department at any time if symptoms persist or worsen.


Instructions:  Fatigue (DC), Hypokalemia (DC)


Forms:  [facility] (English)





- POA


Present On Arrival: Poor Glycemic Control





- Clinical Impression


Clinical Impression: 


 Hypokalemia, Fatigue, Headache








- PA / NP / Resident Statement


MD/DO has reviewed & agrees with the documentation as recorded.





- Scribe Statement


The provider has reviewed the documentation as recorded by the Scribe (Gabrielle Mccormack)


Provider Attestation:


All medical record entries made by the Scribe were at my direction and 

personally dictated by me. I have reviewed the chart and agree that the record 

accurately reflects my personal performance of the history, physical exam, 

medical decision making, and the department course for this patient. I have 

also personally directed, reviewed, and agree with the discharge instructions 

and disposition.

## 2018-05-25 ENCOUNTER — HOSPITAL ENCOUNTER (EMERGENCY)
Dept: HOSPITAL 31 - C.ER | Age: 58
Discharge: HOME | End: 2018-05-25
Payer: COMMERCIAL

## 2018-05-25 VITALS
OXYGEN SATURATION: 97 % | HEART RATE: 75 BPM | TEMPERATURE: 98.3 F | RESPIRATION RATE: 16 BRPM | SYSTOLIC BLOOD PRESSURE: 158 MMHG | DIASTOLIC BLOOD PRESSURE: 83 MMHG

## 2018-05-25 VITALS — BODY MASS INDEX: 25 KG/M2

## 2018-05-25 DIAGNOSIS — R19.7: Primary | ICD-10-CM

## 2018-05-25 NOTE — C.PDOC
History Of Present Illness


59 y/o female with a PMHx of diabetes presents to the ED complaining of diarrhea

, onset today. Patient states she had 1 episode of diarrhea this afternoon. 

Denies any associated abdominal pain, fever, or vomiting. Of note, patient has 

been evaluated in this ED 13 times in 2018 for miscellaneous complaints. 





Time Seen by Provider: 05/25/18 16:04


Chief Complaint (Nursing): GI Problem


History Per: Patient


History/Exam Limitations: no limitations


Onset/Duration Of Symptoms: Hrs


Current Symptoms Are (Timing): Still Present


Associated Symptoms: Diarrhea





Past Medical History


Reviewed: Historical Data, Nursing Documentation, Vital Signs


Vital Signs: 


 Last Vital Signs











Temp  98.3 F   05/25/18 15:48


 


Pulse  75   05/25/18 15:48


 


Resp  16   05/25/18 15:48


 


BP  158/83 H  05/25/18 15:48


 


Pulse Ox  97   05/25/18 16:22














- Medical History


PMH: Diabetes (type II)


   Denies: HTN (PT DENIES), Hypercholesterolemia


Other Surgeries: Myomectomy, Tumor removal of right breast


Family History: States: CAD, Diabetes





- Social History


Hx Tobacco Use: No


Hx Alcohol Use: No


Hx Substance Use: No





- Immunization History


Hx Tetanus Toxoid Vaccination: No


Hx Influenza Vaccination: No


Hx Pneumococcal Vaccination: No





Review Of Systems


Except As Marked, All Systems Reviewed And Found Negative.


Constitutional: Negative for: Fever, Chills


Respiratory: Negative for: Shortness of Breath


Gastrointestinal: Positive for: Diarrhea.  Negative for: Nausea, Vomiting, 

Abdominal Pain, Hematochezia





Physical Exam





- Physical Exam


Appears: Non-toxic, No Acute Distress


Skin: Normal Color, Warm, Dry


Head: Atraumatic, Normacephalic


Eye(s): bilateral: Normal Inspection, PERRL, EOMI


Nose: Normal


Oral Mucosa: Moist


Neck: Normal ROM, Supple


Chest: Symmetrical


Cardiovascular: Rhythm Regular, No Murmur


Respiratory: Normal Breath Sounds, No Accessory Muscle Use


Gastrointestinal/Abdominal: Bowel Sounds (positive), Soft, No Tenderness, No 

Guarding, No Rebound


Extremity: Bilateral: Atraumatic, Normal Color And Temperature, Normal ROM


Neurological/Psych: Oriented x3, Normal Speech





ED Course And Treatment


O2 Sat by Pulse Oximetry: 97 (RA)


Pulse Ox Interpretation: Normal





Medical Decision Making


Medical Decision Making: 


Initial Impression: 59 y/o F with diarrhea





Plan:


Offered patient lab testing, however she declines as she has to leave in order 

to travel to [facility]. 


Patient states she just wanted to get checked prior to trip. 





On examination patient has no tenderness, is AAOx3, and ambulatory with steady 

gait. Patient is stable for discharge.


Advised to follow up with PMD. 





Disposition





- Disposition


Referrals: 


Gregory Tyler MD [Staff Provider] - 


Disposition: HOME/ ROUTINE


Disposition Time: 04:00


Condition: STABLE


Additional Instructions: 


return to er with worsening symptoms or concerns. please follow up with your 

doctor/clinic


Instructions:  Viral Gastroenteritis


Forms:  [facility] (English)





- Clinical Impression


Clinical Impression: 


 Diarrhea








- Scribe Statement


The provider has reviewed the documentation as recorded by the Scribe (Peri Barnes)


Provider Attestation: 





All medical record entries made by the Scribe were at my direction and 

personally dictated by me. I have reviewed the chart and agree that the record 

accurately reflects my personal performance of the history, physical exam, 

medical decision making, and the department course for this patient. I have 

also personally directed, reviewed, and agree with the discharge instructions 

and disposition.

## 2018-06-20 ENCOUNTER — HOSPITAL ENCOUNTER (EMERGENCY)
Dept: HOSPITAL 31 - C.ER | Age: 58
Discharge: HOME | End: 2018-06-20
Payer: COMMERCIAL

## 2018-06-20 VITALS
RESPIRATION RATE: 20 BRPM | HEART RATE: 88 BPM | TEMPERATURE: 98.2 F | SYSTOLIC BLOOD PRESSURE: 138 MMHG | OXYGEN SATURATION: 99 % | DIASTOLIC BLOOD PRESSURE: 74 MMHG

## 2018-06-20 VITALS — BODY MASS INDEX: 25 KG/M2

## 2018-06-20 DIAGNOSIS — B37.2: Primary | ICD-10-CM

## 2018-06-20 NOTE — C.PDOC
History Of Present Illness


58 year old female presents to ED with complaints of rash under both her 

breasts which she noticed today. She denies any fever, pain, itching, drainage 

or other associated complaints.


Time Seen by Provider: 06/20/18 19:42


Chief Complaint (Nursing): Abnormal Skin Integrity


History Per: Patient


History/Exam Limitations: no limitations


Onset/Duration Of Symptoms: Hrs





Past Medical History


Reviewed: Historical Data, Nursing Documentation, Vital Signs


Vital Signs: 





 Last Vital Signs











Temp  98.2 F   06/20/18 19:37


 


Pulse  88   06/20/18 19:37


 


Resp  20   06/20/18 19:37


 


BP  138/74   06/20/18 19:37


 


Pulse Ox  99   06/20/18 19:37














- Medical History


PMH: Diabetes (type II)


Family History: States: CAD, Diabetes





- Social History


Hx Tobacco Use: No


Hx Alcohol Use: No


Hx Substance Use: No





- Immunization History


Hx Tetanus Toxoid Vaccination: No


Hx Influenza Vaccination: No


Hx Pneumococcal Vaccination: No





Review Of Systems


Except As Marked, All Systems Reviewed And Found Negative.


Skin: Positive for: Rash





Physical Exam





- Physical Exam


Appears: Non-toxic, No Acute Distress


Skin: Warm, Dry, Other (hyperpigmented erythematous shiny lacy bordered rash 

with white patches under bilateral breasts)


Head: Atraumatic, Normacephalic


Eye(s): bilateral: Normal Inspection


Neck: Normal ROM


Chest: Symmetrical, Other (breasts large and symmetric)


Extremity: Bilateral: Atraumatic


Neurological/Psych: Oriented x3, Normal Speech


Gait: Steady





ED Course And Treatment


O2 Sat by Pulse Oximetry: 99





Medical Decision Making


Medical Decision Making: 





Patient with complaints of rash, consistent with cuteaneous candida. Recommend 

cream to use and keep area clean and dry





Disposition


Counseled Patient/Family Regarding: Diagnosis, Need For Followup, Rx Given





- Disposition


Referrals: 


Donna Terrell MD [Staff Provider] - 


Disposition: HOME/ ROUTINE


Disposition Time: 19:50


Condition: GOOD


Additional Instructions: 


Apply cream to affected areas twice a day for 1-2 weeks


Prescriptions: 


Miconazole 2% [Miconazole 2% Cream] 1 ea EXT BID #1 tube


Instructions:  Yeast Infection (DC)





- POA


Present On Arrival: None





- Clinical Impression


Clinical Impression: 


 Candidal skin infection

## 2018-08-11 ENCOUNTER — HOSPITAL ENCOUNTER (EMERGENCY)
Dept: HOSPITAL 31 - C.ER | Age: 58
Discharge: HOME | End: 2018-08-11
Payer: COMMERCIAL

## 2018-08-11 VITALS
TEMPERATURE: 98.1 F | DIASTOLIC BLOOD PRESSURE: 88 MMHG | RESPIRATION RATE: 20 BRPM | HEART RATE: 73 BPM | SYSTOLIC BLOOD PRESSURE: 134 MMHG | OXYGEN SATURATION: 99 %

## 2018-08-11 VITALS — BODY MASS INDEX: 25 KG/M2

## 2018-08-11 DIAGNOSIS — E11.65: Primary | ICD-10-CM

## 2018-08-11 NOTE — C.PDOC
History Of Present Illness


59 y/o female presents to ED for evaluation of high blood sugar. Notes she 

takes Metformin but did not take any today. Otherwise, denies any physical 

complaints at this time.





Time Seen by Provider: 08/11/18 08:25


Chief Complaint (Nursing): High Blood Sugar


History Per: Patient


History/Exam Limitations: no limitations


Onset/Duration Of Symptoms: Hrs


Current Symptoms Are (Timing): Gone


Severity: None


Treatment Prior To Provider Evaluation: None





Past Medical History


Reviewed: Historical Data, Nursing Documentation, Vital Signs


Vital Signs: 


 Last Vital Signs











Temp  98.1 F   08/11/18 08:23


 


Pulse  73   08/11/18 08:23


 


Resp  20   08/11/18 08:23


 


BP  134/88   08/11/18 08:23


 


Pulse Ox  99   08/11/18 09:00














- Medical History


PMH: Diabetes (type II)


Surgical History: No Surg Hx


Family History: States: CAD, Diabetes





- Social History


Hx Tobacco Use: No


Hx Alcohol Use: No


Hx Substance Use: No





- Immunization History


Hx Tetanus Toxoid Vaccination: No


Hx Influenza Vaccination: No


Hx Pneumococcal Vaccination: No





Review Of Systems


Except As Marked, All Systems Reviewed And Found Negative.


Constitutional: Negative for: Fever, Chills


Cardiovascular: Negative for: Chest Pain


Respiratory: Negative for: Shortness of Breath


Gastrointestinal: Negative for: Nausea, Vomiting, Abdominal Pain





Physical Exam





- Physical Exam


Appears: Non-toxic, No Acute Distress


Skin: Normal Color, Warm, Dry


Head: Atraumatic, Normacephalic


Eye(s): bilateral: Normal Inspection


Oral Mucosa: Moist


Tongue: Normal Appearing


Lips: Normal Appearing


Gingiva: Normal Appearing


Cardiovascular: Rhythm Regular


Respiratory: Normal Breath Sounds, No Rales, No Rhonchi, No Wheezing


Gastrointestinal/Abdominal: Soft, No Tenderness


Extremity: Normal ROM


Neurological/Psych: Oriented x3, Normal Speech


Gait: Steady





ED Course And Treatment


O2 Sat by Pulse Oximetry: 99


Pulse Ox Interpretation: Normal





Medical Decision Making


Medical Decision Making: 





Pt refused to have any work up. Pt was given Metformin.








Disposition


Counseled Patient/Family Regarding: Studies Performed, Diagnosis





- Disposition


Referrals: 


[facility] [Outside]


[facility] [Outside]


Disposition: HOME/ ROUTINE


Disposition Time: 09:00


Condition: STABLE


Additional Instructions: 


Follow up with your PMD or clinic for further evaluation


Take medications as directed


Instructions:  Hyperglycemia, Adult, The ABCs of Diabetes


Forms:  [facility] (English)





- POA


Present On Arrival: None





- Clinical Impression


Clinical Impression: 


 Hyperglycemia due to type 2 diabetes mellitus








- PA / NP / Resident Statement


MD/DO has reviewed & agrees with the documentation as recorded.





- Scribe Statement


The provider has reviewed the documentation as recorded by the Scribe





KP





All medical record entries made by the Scribe were at my direction and 

personally dictated by me. I have reviewed the chart and agree that the record 

accurately reflects my personal performance of the history, physical exam, 

medical decision making, and the department course for this patient. I have 

also personally directed, reviewed, and agree with the discharge instructions 

and disposition.

## 2018-08-12 ENCOUNTER — HOSPITAL ENCOUNTER (EMERGENCY)
Dept: HOSPITAL 31 - C.ER | Age: 58
Discharge: HOME | End: 2018-08-12
Payer: COMMERCIAL

## 2018-08-12 VITALS
RESPIRATION RATE: 16 BRPM | DIASTOLIC BLOOD PRESSURE: 85 MMHG | OXYGEN SATURATION: 99 % | HEART RATE: 72 BPM | SYSTOLIC BLOOD PRESSURE: 144 MMHG | TEMPERATURE: 97.6 F

## 2018-08-12 VITALS — BODY MASS INDEX: 25 KG/M2

## 2018-08-12 DIAGNOSIS — E11.65: Primary | ICD-10-CM

## 2018-08-12 LAB
ALBUMIN SERPL-MCNC: 4.3 G/DL (ref 3.5–5)
ALBUMIN/GLOB SERPL: 1.4 {RATIO} (ref 1–2.1)
ALT SERPL-CCNC: 49 U/L (ref 9–52)
AST SERPL-CCNC: 32 U/L (ref 14–36)
BASE EXCESS BLDV CALC-SCNC: 0.8 MMOL/L (ref 0–2)
BASOPHILS # BLD AUTO: 0.1 K/UL (ref 0–0.2)
BASOPHILS NFR BLD: 1.2 % (ref 0–2)
BUN SERPL-MCNC: 12 MG/DL (ref 7–17)
CALCIUM SERPL-MCNC: 10 MG/DL (ref 8.6–10.4)
EOSINOPHIL # BLD AUTO: 0.3 K/UL (ref 0–0.7)
EOSINOPHIL NFR BLD: 7.2 % (ref 0–4)
ERYTHROCYTE [DISTWIDTH] IN BLOOD BY AUTOMATED COUNT: 13.1 % (ref 11.5–14.5)
GFR NON-AFRICAN AMERICAN: > 60
HGB BLD-MCNC: 13.7 G/DL (ref 11–16)
LYMPHOCYTES # BLD AUTO: 1.9 K/UL (ref 1–4.3)
LYMPHOCYTES NFR BLD AUTO: 40 % (ref 20–40)
MCH RBC QN AUTO: 30 PG (ref 27–31)
MCHC RBC AUTO-ENTMCNC: 34.5 G/DL (ref 33–37)
MCV RBC AUTO: 86.8 FL (ref 81–99)
MONOCYTES # BLD: 0.4 K/UL (ref 0–0.8)
MONOCYTES NFR BLD: 8 % (ref 0–10)
NEUTROPHILS # BLD: 2 K/UL (ref 1.8–7)
NEUTROPHILS NFR BLD AUTO: 43.6 % (ref 50–75)
NRBC BLD AUTO-RTO: 0 % (ref 0–2)
PCO2 BLDV: 49 MMHG (ref 40–60)
PH BLDV: 7.35 [PH] (ref 7.32–7.43)
PLATELET # BLD: 227 K/UL (ref 130–400)
PMV BLD AUTO: 11.1 FL (ref 7.2–11.7)
RBC # BLD AUTO: 4.57 MIL/UL (ref 3.8–5.2)
VENOUS BLOOD GAS PO2: 35 MM/HG (ref 30–55)
WBC # BLD AUTO: 4.6 K/UL (ref 4.8–10.8)

## 2018-08-12 PROCEDURE — 85025 COMPLETE CBC W/AUTO DIFF WBC: CPT

## 2018-08-12 PROCEDURE — 99284 EMERGENCY DEPT VISIT MOD MDM: CPT

## 2018-08-12 PROCEDURE — 84484 ASSAY OF TROPONIN QUANT: CPT

## 2018-08-12 PROCEDURE — 82803 BLOOD GASES ANY COMBINATION: CPT

## 2018-08-12 PROCEDURE — 96360 HYDRATION IV INFUSION INIT: CPT

## 2018-08-12 PROCEDURE — 96361 HYDRATE IV INFUSION ADD-ON: CPT

## 2018-08-12 PROCEDURE — 82948 REAGENT STRIP/BLOOD GLUCOSE: CPT

## 2018-08-12 PROCEDURE — 80053 COMPREHEN METABOLIC PANEL: CPT

## 2018-08-12 NOTE — C.PDOC
History Of Present Illness


58 year old female with PMHx of Type 2 Diabetes presents to the ED complaining 

of elevated blood sugar. Patient states she ran out of medications. She denies 

any fever, chills, nausea, vomiting or cough. Patient reports she was seen in 

the ED yesterday but did not receive prescription.  


Time Seen by Provider: 08/12/18 09:55


Chief Complaint (Nursing): High Blood Sugar


History Per: Patient


History/Exam Limitations: no limitations


Onset/Duration Of Symptoms: Days


Current Symptoms Are (Timing): Still Present


Causative (Exacerbating) Factor(s): Missed Taking Medication


Associated Infectious Symptoms: denies: Cough, Nausea, Vomiting





Past Medical History


Reviewed: Historical Data, Nursing Documentation, Vital Signs


Vital Signs: 


 Last Vital Signs











Temp  97.6 F   08/12/18 11:57


 


Pulse  72   08/12/18 11:57


 


Resp  16   08/12/18 11:57


 


BP  144/85   08/12/18 11:57


 


Pulse Ox  99   08/12/18 13:49














- Medical History


PMH: Diabetes (type II)


Other Surgeries: Hx of surgeries


Family History: States: CAD, Diabetes





- Social History


Hx Tobacco Use: No


Hx Alcohol Use: No


Hx Substance Use: No





- Immunization History


Hx Tetanus Toxoid Vaccination: No


Hx Influenza Vaccination: No


Hx Pneumococcal Vaccination: No





Review Of Systems


Except As Marked, All Systems Reviewed And Found Negative.


Constitutional: Negative for: Fever, Chills


Respiratory: Negative for: Cough


Gastrointestinal: Positive for: Other (high blood sugar ).  Negative for: Nausea

, Vomiting





Physical Exam





- Physical Exam


Appears: Non-toxic, No Acute Distress


Skin: Warm, Dry


Head: Atraumatic, Normacephalic


Eye(s): bilateral: Normal Inspection


Nose: Normal


Oral Mucosa: Moist


Neck: Supple


Chest: Symmetrical


Respiratory: Normal Breath Sounds, No Rales, No Rhonchi


Gastrointestinal/Abdominal: Soft, No Tenderness


Extremity: Normal ROM


Neurological/Psych: Oriented x3, Normal Speech


Gait: Steady





ED Course And Treatment





- Laboratory Results


Result Diagrams: 


 08/12/18 10:11





 08/12/18 10:11


O2 Sat by Pulse Oximetry: 99 (RA)


Pulse Ox Interpretation: Normal





Medical Decision Making


Medical Decision Making: 





Orders: 


- EKG


- IV Fluids


- Metformin 





On reassessment, patient is resting comfortably, and is in no acute distress. 

Patient was given Rx for Metformin. Patient was instructed to follow up with 

physician/clinic for further evaluation.





Disposition





- Disposition


Referrals: 


[facility] Patricio Hartman, [Non-Staff] - 


Disposition: HOME/ ROUTINE


Disposition Time: 11:00


Condition: IMPROVED


Additional Instructions: 





SHAYE ROJAS, thank you for letting us take care of you today. Your provider 

was Abdirahman Jose DO and you were treated for HIGH BLOOD SUGAR. The 

emergency medical care you received today was directed at your acute symptoms. 

If you were prescribed any medication, please fill it and take as directed. It 

may take several days for your symptoms to resolve. Return to the Emergency 

Department if your symptoms worsen, do not improve, or if you have any other 

problems.





Please contact your doctor or call one of the physicians/clinics you have been 

referred to that are listed on the Patient Visit Information form that is 

included in your discharge packet. Bring any paperwork you were given at 

discharge with you along with any medications you are taking to your follow up 

visit. Our treatment cannot replace ongoing medical care by a primary care 

provider outside of the emergency department.





Thank you for allowing the [facility] team to be part of your care today.











Take your medication as prescribed and follow up with your primary care doctor 

in 2-3 days for re-evaluation and further management.


Prescriptions: 


MetFORMIN [glucoPHAGE] 1,000 mg PO BID #14 tab


Instructions:  Hyperglycemia, Adult (DC), The ABCs of Diabetes


Forms:  [facility] (English)





- Clinical Impression


Clinical Impression: 


 Hyperglycemia due to type 2 diabetes mellitus








- Scribe Statement


The provider has reviewed the documentation as recorded by the Scribe


Keiry Liu








All medical record entries made by the Scribe were at my direction and 

personally dictated by me. I have reviewed the chart and agree that the record 

accurately reflects my personal performance of the history, physical exam, 

medical decision making, and the department course for this patient. I have 

also personally directed, reviewed, and agree with the discharge instructions 

and disposition.

## 2018-10-12 ENCOUNTER — HOSPITAL ENCOUNTER (EMERGENCY)
Dept: HOSPITAL 31 - C.ER | Age: 58
Discharge: HOME | End: 2018-10-12
Payer: COMMERCIAL

## 2018-10-12 VITALS
SYSTOLIC BLOOD PRESSURE: 132 MMHG | DIASTOLIC BLOOD PRESSURE: 66 MMHG | HEART RATE: 76 BPM | RESPIRATION RATE: 18 BRPM | OXYGEN SATURATION: 100 %

## 2018-10-12 VITALS — TEMPERATURE: 98.5 F

## 2018-10-12 VITALS — BODY MASS INDEX: 25 KG/M2

## 2018-10-12 DIAGNOSIS — E11.65: Primary | ICD-10-CM

## 2018-10-12 LAB
ALBUMIN SERPL-MCNC: 4 G/DL (ref 3.5–5)
ALBUMIN/GLOB SERPL: 1.2 {RATIO} (ref 1–2.1)
ALT SERPL-CCNC: 28 U/L (ref 9–52)
AST SERPL-CCNC: 29 U/L (ref 14–36)
BACTERIA #/AREA URNS HPF: (no result) /[HPF]
BASOPHILS # BLD AUTO: 0.1 K/UL (ref 0–0.2)
BASOPHILS NFR BLD: 1.3 % (ref 0–2)
BILIRUB UR-MCNC: NEGATIVE MG/DL
BNP SERPL-MCNC: 16.5 PG/ML (ref 0–900)
BUN SERPL-MCNC: 10 MG/DL (ref 7–17)
CALCIUM SERPL-MCNC: 9.8 MG/DL (ref 8.6–10.4)
EOSINOPHIL # BLD AUTO: 0.2 K/UL (ref 0–0.7)
EOSINOPHIL NFR BLD: 4.3 % (ref 0–4)
ERYTHROCYTE [DISTWIDTH] IN BLOOD BY AUTOMATED COUNT: 12.2 % (ref 11.5–14.5)
GFR NON-AFRICAN AMERICAN: > 60
GLUCOSE UR STRIP-MCNC: (no result) MG/DL
HGB BLD-MCNC: 13.2 G/DL (ref 11–16)
LEUKOCYTE ESTERASE UR-ACNC: (no result) LEU/UL
LYMPHOCYTES # BLD AUTO: 1.9 K/UL (ref 1–4.3)
LYMPHOCYTES NFR BLD AUTO: 44.4 % (ref 20–40)
MCH RBC QN AUTO: 29.6 PG (ref 27–31)
MCHC RBC AUTO-ENTMCNC: 34.5 G/DL (ref 33–37)
MCV RBC AUTO: 86 FL (ref 81–99)
MONOCYTES # BLD: 0.4 K/UL (ref 0–0.8)
MONOCYTES NFR BLD: 9.5 % (ref 0–10)
NEUTROPHILS # BLD: 1.8 K/UL (ref 1.8–7)
NEUTROPHILS NFR BLD AUTO: 40.5 % (ref 50–75)
NRBC BLD AUTO-RTO: 0 % (ref 0–2)
PH UR STRIP: 5 [PH] (ref 5–8)
PLATELET # BLD: 303 K/UL (ref 130–400)
PMV BLD AUTO: 9.9 FL (ref 7.2–11.7)
PROT UR STRIP-MCNC: NEGATIVE MG/DL
RBC # BLD AUTO: 4.46 MIL/UL (ref 3.8–5.2)
RBC # UR STRIP: NEGATIVE /UL
SP GR UR STRIP: 1.02 (ref 1–1.03)
SQUAMOUS EPITHIAL: 2 /HPF (ref 0–5)
UROBILINOGEN UR-MCNC: 2 MG/DL (ref 0.2–1)
WBC # BLD AUTO: 4.4 K/UL (ref 4.8–10.8)

## 2018-10-12 NOTE — C.PDOC
History Of Present Illness


58 year old female patient with hx of DM presents to the ER with c/o weakness, 

fatigue and dizziness for x2 weeks. Patient denies nausea, vomiting, diarrhea, 

chest pain, SOB, cough, fever, recent travel or change of medications. 


Time Seen by Provider: 10/12/18 19:41


Chief Complaint (Nursing): Weakness/Neurological Deficit


History Per: Patient


History/Exam Limitations: no limitations


Onset/Duration Of Symptoms: Days (x2 weeks)


Current Symptoms Are (Timing): Still Present





Past Medical History


Reviewed: Historical Data, Nursing Documentation, Vital Signs


Vital Signs: 





                                Last Vital Signs











Temp  98.5 F   10/12/18 18:31


 


Pulse  79   10/12/18 18:31


 


Resp  20   10/12/18 18:31


 


BP  143/85   10/12/18 18:31


 


Pulse Ox  98   10/12/18 18:31














- Medical History


PMH: Diabetes (type II)


Family History: States: CAD, Diabetes





- Social History


Hx Tobacco Use: No


Hx Alcohol Use: No


Hx Substance Use: No





- Immunization History


Hx Tetanus Toxoid Vaccination: No


Hx Influenza Vaccination: No


Hx Pneumococcal Vaccination: No





Review Of Systems


Except As Marked, All Systems Reviewed And Found Negative.


Constitutional: Positive for: Other (fatigue )


Neurological: Positive for: Weakness, Dizziness





Physical Exam





- Physical Exam


Appears: Non-toxic, No Acute Distress


Skin: Normal Color, Warm, Dry


Head: Atraumatic, Normacephalic


Eye(s): bilateral: Normal Inspection, PERRL, EOMI


Nose: Normal


Oral Mucosa: Moist


Chest: Symmetrical


Cardiovascular: Rhythm Regular, No Murmur


Respiratory: Normal Breath Sounds, No Rales, No Rhonchi, No Wheezing


Gastrointestinal/Abdominal: Soft, No Tenderness


Extremity: Normal ROM (x4)


Extremity: Bilateral: Atraumatic, Normal Color And Temperature


Neurological/Psych: Oriented x3, Normal Speech





ED Course And Treatment





- Laboratory Results


Result Diagrams: 


                                 10/12/18 20:10





                                 10/12/18 20:10


ECG: Interpreted By Me, Viewed By Me


ECG Rhythm: Sinus Rhythm


ECG Interpretation: Normal


Interpretation Of ECG: Poor r wave progression, normal intervals and axis, no 

ST/T wave abnormalities


Rate From EC


O2 Sat by Pulse Oximetry: 98 (RA)


Pulse Ox Interpretation: Normal





Medical Decision Making


Medical Decision Making: 


Impression: generalized weakness


Plans: 


-- Glucose, POC





Diagnosis is hyperglycemia, patient reports improvement of symptoms, will 

discharge home with instructions to follow up with PMD.





Disposition





- Disposition


Disposition: HOME/ ROUTINE


Disposition Time: 22:49


Condition: STABLE


Additional Instructions: 


follow up with your doctor within 2 days


call to make an appointment


continue your home medications


return to ER if symptoms worsens or progress 


Instructions:  Hyperglycemia, Adult


Forms:  [facility] (English), General Discharge Instructions





- Clinical Impression


Clinical Impression: 


 Hyperglycemia








- Scribe Statement


The provider has reviewed the documentation as recorded by the Humberto Wheat Do


Provider Attestation: 


All medical record entries made by the Humberto were at my direction and 

personally dictated by me. I have reviewed the chart and agree that the record 

accurately reflects my personal performance of the history, physical exam, 

medical decision making, and the department course for this patient. I have also

personally directed, reviewed, and agree with the discharge instructions and 

disposition.

## 2018-10-13 NOTE — RAD
HISTORY:

 SOB 



COMPARISON:

Chest x-ray performed 1/16/17 



TECHNIQUE:

Chest PA and lateral



FINDINGS:

Examination limited by habitus.



LUNGS:

No focal consolidation.



Please note that chest x-ray has limited sensitivity for the 

detection of pulmonary masses.



PLEURA:

No significant pleural effusion identified. No definite pneumothorax .



CARDIOVASCULAR:

The cardiomediastinal silhouette appears within normal limits of 

size. 



OSSEOUS STRUCTURES:

Degenerative changes.



VISUALIZED UPPER ABDOMEN:

Unremarkable.



OTHER FINDINGS:

None.



IMPRESSION:

No focal consolidation, significant pleural effusion, or definite 

pneumothorax identified.

## 2018-10-15 NOTE — CARD
--------------- APPROVED REPORT --------------





Date of service: 10/12/2018



EKG Measurement

Heart [id number]

TX [id number]

[id number]

[id number]

[id number]



<Conclusion>

Normal sinus rhythm

Possible Septal infarct, age undetermined

Diffuse ST segment elevation, with reciprocal ST depression in AVR, 

consider pericarditis

Abnormal ECG

## 2018-10-23 ENCOUNTER — HOSPITAL ENCOUNTER (EMERGENCY)
Dept: HOSPITAL 31 - C.ER | Age: 58
Discharge: HOME | End: 2018-10-23
Payer: COMMERCIAL

## 2018-10-23 VITALS
HEART RATE: 65 BPM | TEMPERATURE: 98.9 F | RESPIRATION RATE: 20 BRPM | DIASTOLIC BLOOD PRESSURE: 76 MMHG | SYSTOLIC BLOOD PRESSURE: 128 MMHG

## 2018-10-23 VITALS — BODY MASS INDEX: 25 KG/M2

## 2018-10-23 VITALS — OXYGEN SATURATION: 99 %

## 2018-10-23 DIAGNOSIS — K59.00: ICD-10-CM

## 2018-10-23 DIAGNOSIS — H10.9: Primary | ICD-10-CM

## 2018-10-23 DIAGNOSIS — E11.9: ICD-10-CM

## 2018-10-23 LAB
BILIRUB UR-MCNC: NEGATIVE MG/DL
GLUCOSE UR STRIP-MCNC: (no result) MG/DL
LEUKOCYTE ESTERASE UR-ACNC: (no result) LEU/UL
PH UR STRIP: 5 [PH] (ref 5–8)
PROT UR STRIP-MCNC: NEGATIVE MG/DL
RBC # UR STRIP: NEGATIVE /UL
SP GR UR STRIP: 1.01 (ref 1–1.03)
SQUAMOUS EPITHIAL: 2 /HPF (ref 0–5)
UROBILINOGEN UR-MCNC: NORMAL MG/DL (ref 0.2–1)

## 2018-10-23 NOTE — C.PDOC
History Of Present Illness





58 year old female with a history of diabetes presents to the ED for evaluation 

of a right itchy and swollen eye that started 1day ago. Patient reports she had 

irritation to the right eye yesterday at 3am and worsened when she woke up with 

tears. Patient also reports left flank plain and abdominal discomfort. Admits to

chills. Denies hematuria, dysuria, headache, fever, nausea, vomiting, vision 

change, and any other associated symptoms. 





Time Seen by Provider: 10/23/18 09:26


Chief Complaint (Nursing): Eye Problem


History Per: Patient


History/Exam Limitations: no limitations


Onset/Duration Of Symptoms: Days


Current Symptoms Are (Timing): Still Present





Past Medical History


Reviewed: Historical Data, Nursing Documentation, Vital Signs


Vital Signs: 





                                Last Vital Signs











Temp  97.9 F   10/23/18 08:49


 


Pulse  69   10/23/18 08:49


 


Resp  18   10/23/18 08:49


 


BP  165/80 H  10/23/18 08:49


 


Pulse Ox  99   10/23/18 08:49














- Medical History


PMH: Diabetes (type II)


Family History: States: CAD, Diabetes





- Social History


Hx Tobacco Use: No


Hx Alcohol Use: No


Hx Substance Use: No





- Immunization History


Hx Tetanus Toxoid Vaccination: No


Hx Influenza Vaccination: No


Hx Pneumococcal Vaccination: No





Review Of Systems


Except As Marked, All Systems Reviewed And Found Negative.


Constitutional: Positive for: Chills.  Negative for: Fever


Eyes: Positive for: Other (rigth eye itchyness and swelling. ).  Negative for: 

Vision Change


Gastrointestinal: Positive for: Other (left flank pain. abdominal discomfort. ).

 Negative for: Nausea, Vomiting


Genitourinary: Negative for: Dysuria, Hematuria


Neurological: Negative for: Headache





Physical Exam





- Physical Exam


Appears: Non-toxic, No Acute Distress


Skin: Warm, Dry


Head: Atraumatic, Normacephalic


Eye(s): bilateral: Other (Edema to the right and left eye. Conjunctiva 

injection. (-)Fluorescein test. )


Oral Mucosa: Moist


Neck: Normal ROM, Supple


Neurological/Psych: Oriented x3, Normal Speech





ED Course And Treatment


O2 Sat by Pulse Oximetry: 99 (RA)


Pulse Ox Interpretation: Normal





- Other Rad


  ** X-ray ABD


X-Ray: Viewed By Me, Read By Radiologist


Interpretation: FINDINGS:  BOWEL:  Nonobstructive bowel gas pattern. No definite

free air however please note the entirety of the right hemidiaphragm is excluded

from view. Mild to moderate constipation.  BONES:  No acute osseous abnormality 

is detected. Degenerative changes of the spine.  OTHER FINDINGS:  None.  

IMPRESSION:  Mild-to-moderate constipation.





Medical Decision Making


Medical Decision Making: 





Plan: 


--Blood sent. 


--Glucose POC.


--Fluorescein. 


--X-ray Abdomen. 


--Urinalysis. 





Progress/Update: Patient stable for discharge home. Prescribed Tobramycin, 

Magnesium Citrate, Miralax, and Benefiber. 











Disposition


Counseled Patient/Family Regarding: Studies Performed, Diagnosis, Need For 

Followup, Rx Given





- Disposition


Referrals: 


[facility] [Outside]


Michelet Byrnes [Staff Provider] - 


Disposition: HOME/ ROUTINE


Disposition Time: 11:43


Condition: STABLE


Prescriptions: 


Dexamethasone/Tobramycin [Tobradex 0.1%-0.3% 2.5 Ml] 1 drop OD TID #1 bottle


Magnesium Citrate 295 ml PO DAILY #1 bottle


Polyethylene Glycol 3350 [Miralax] 17 gm PO DAILY PRN #170 gm


 PRN Reason: Constipation


Wheat Dextrin [Benefiber] 1 each PO DAILY #20 powd.pack


Instructions:  Constipation, Adult (DC), Low Back Pain  (DC), Conjunctivitis 

(Noninfectious Pinkeye) (DC)


Forms:  [facility] (English), General Discharge Instructions





- POA


Present On Arrival: None





- Clinical Impression


Clinical Impression: 


 Conjunctivitis, Constipation








- Scribe Statement


The provider has reviewed the documentation as recorded by the Scribe (Lizeth Stewart)


Provider Attestation: 





All medical record entries made by the Scribe were at my direction and 

personally dictated by me. I have reviewed the chart and agree that the record 

accurately reflects my personal performance of the history, physical exam, 

medical decision making, and the department course for this patient. I have also

 personally directed, reviewed, and agree with the discharge instructions and 

disposition.

## 2018-10-23 NOTE — RAD
Date of service: 10/23/2018



HISTORY:

 abd pain 



COMPARISON:

CT abdomen pelvis without contrast performed 12/2/16.



FINDINGS:



BOWEL:

Nonobstructive bowel gas pattern. No definite free air however please 

note the entirety of the right hemidiaphragm is excluded from view. 

Mild to moderate constipation. 



BONES:

No acute osseous abnormality is detected. Degenerative changes of the 

spine.



OTHER FINDINGS:

None.



IMPRESSION:

Mild-to-moderate constipation.

## 2018-10-25 ENCOUNTER — HOSPITAL ENCOUNTER (EMERGENCY)
Dept: HOSPITAL 31 - C.ER | Age: 58
LOS: 1 days | Discharge: HOME | End: 2018-10-26
Payer: COMMERCIAL

## 2018-10-25 VITALS — BODY MASS INDEX: 25 KG/M2

## 2018-10-25 VITALS — RESPIRATION RATE: 20 BRPM | OXYGEN SATURATION: 98 %

## 2018-10-25 DIAGNOSIS — R31.9: ICD-10-CM

## 2018-10-25 DIAGNOSIS — K59.00: Primary | ICD-10-CM

## 2018-10-25 LAB
BACTERIA #/AREA URNS HPF: (no result) /[HPF]
BILIRUB UR-MCNC: NEGATIVE MG/DL
GLUCOSE UR STRIP-MCNC: (no result) MG/DL
LEUKOCYTE ESTERASE UR-ACNC: (no result) LEU/UL
PH UR STRIP: 5 [PH] (ref 5–8)
PROT UR STRIP-MCNC: (no result) MG/DL
RBC # UR STRIP: (no result) /UL
SP GR UR STRIP: 1.01 (ref 1–1.03)
SQUAMOUS EPITHIAL: 3 /HPF (ref 0–5)
UROBILINOGEN UR-MCNC: NORMAL MG/DL (ref 0.2–1)

## 2018-10-25 NOTE — C.PDOC
History Of Present Illness


58 year old female with a Hx of diabetes presents to the ER with a complaint of 

a stabbing right sided back pain that began 45 minutes PTA. Patient states the 

pain worsens with movement. She took aleve with no relief. Denies trauma or 

urinary symptoms. Patient carrying multiple heavy bags.


Time Seen by Provider: 10/25/18 21:09


Chief Complaint (Nursing): Back Pain


History Per: Patient


History/Exam Limitations: no limitations


Onset/Duration Of Symptoms: Mins


Current Symptoms Are (Timing): Still Present


Quality Of Discomfort: Stabbing


Previous Symptoms: None


Associated Symptoms: None


Exacerbating Factor(s): Movement


Recent travel outside of the [place]: No





Past Medical History


Reviewed: Historical Data, Nursing Documentation, Vital Signs


Vital Signs: 





                                Last Vital Signs











Temp  97.9 F   10/25/18 21:04


 


Pulse  70   10/25/18 21:04


 


Resp  20   10/25/18 21:04


 


BP  163/93 H  10/25/18 21:04


 


Pulse Ox  98   10/25/18 21:04














- Medical History


PMH: Diabetes (type II)


Family History: States: CAD, Diabetes





- Social History


Hx Tobacco Use: No


Hx Alcohol Use: No


Hx Substance Use: No





- Immunization History


Hx Tetanus Toxoid Vaccination: No


Hx Influenza Vaccination: No


Hx Pneumococcal Vaccination: No





Review Of Systems


Cardiovascular: Negative for: Chest Pain


Respiratory: Negative for: Cough, Shortness of Breath


Gastrointestinal: Negative for: Nausea, Vomiting, Abdominal Pain


Genitourinary: Negative for: Dysuria, Incontinence, Hematuria


Musculoskeletal: Positive for: Back Pain


Skin: Negative for: Rash


Neurological: Negative for: Weakness, Numbness





Physical Exam





- Physical Exam


Appears: Non-toxic, No Acute Distress (lying comfortably on stretcher)


Skin: Normal Color, Warm, Dry


Head: Atraumatic, Normacephalic


Eye(s): bilateral: Normal Inspection


Neck: Supple


Gastrointestinal/Abdominal: Bowel Sounds (Normal), Soft, No Tenderness, No 

Distention, No Guarding, No Rebound


Back: No CVA Tenderness, No Vertebral Tenderness, Paraspinal Tenderness (Right 

lumbar)


Extremity: Normal ROM (x4)


Neurological/Psych: Oriented x3, Normal Speech, Normal Motor, Normal Sensation


Gait: Steady





ED Course And Treatment


O2 Sat by Pulse Oximetry: 98 (Room air)


Pulse Ox Interpretation: Normal





Medical Decision Making


Medical Decision Making: 


Urinalysis ordered. Tylenol administered.





2328 pt has been resting comfortably. ct results show moderate amt of stool on 

right side, no kidney stones, no hydronephrosis, normal appearing appendix and 

bladder.  results discussed with pt. pt seen in ed 2 days ago for left flank 

pain , dx constipation, discharged with miralax, benefiber and magnesium 

citrate. pt reports taking it and last bm today. discussed with pt that she 

needs to f/u with urology for hematuria.  





Disposition


Counseled Patient/Family Regarding: Studies Performed, Diagnosis, Need For 

Followup, Rx Given





- Disposition


Referrals: 


Griffin Gerber MD [Staff Provider] - 


Disposition: HOME/ ROUTINE


Disposition Time: 23:34


Condition: GOOD


Additional Instructions: 


Please continue to take Benefiber and use Miralax as prescribed. Drink more 

water. Take medication for diabetes.  Follow up with your primary care doctor 

and with Dr Gerber (urologist) for blood in urine. 


Prescriptions: 


Ibuprofen [Motrin] 600 mg PO TID #30 tab


Instructions:  High Fiber Diet, Constipation, Adult (DC), Blood in the Urine 

(Hematuria), Adult (DC)


Forms:  [facility] (English), General Discharge Instructions





- Clinical Impression


Clinical Impression: 


 Constipation, Hematuria








- PA / NP / Resident Statement


MD/DO has reviewed & agrees with the documentation as recorded.





- Scribe Statement


The provider has reviewed the documentation as recorded by the Scribdanyell Bailey





All medical record entries made by the Steveibdanyell were at my direction and 

personally dictated by me. I have reviewed the chart and agree that the record 

accurately reflects my personal performance of the history, physical exam, 

medical decision making, and the department course for this patient. I have also

 personally directed, reviewed, and agree with the discharge instructions and 

disposition.

## 2018-10-25 NOTE — C.PDOC
Time Seen by Provider: 10/25/18 21:09


Chief Complaint (Nursing): Back Pain





Past Medical History


Vital Signs: 





                                Last Vital Signs











Temp  97.9 F   10/25/18 21:04


 


Pulse  70   10/25/18 21:04


 


Resp  20   10/25/18 21:04


 


BP  163/93 H  10/25/18 21:04


 


Pulse Ox  98   10/25/18 21:04














- Medical History


PMH: Diabetes (type II)


Family History: States: CAD, Diabetes





- Social History


Hx Tobacco Use: No


Hx Alcohol Use: No


Hx Substance Use: No





- Immunization History


Hx Tetanus Toxoid Vaccination: No


Hx Influenza Vaccination: No


Hx Pneumococcal Vaccination: No





ED Course And Treatment


O2 Sat by Pulse Oximetry: 98





Disposition





- Disposition

## 2018-10-26 VITALS — TEMPERATURE: 98 F | HEART RATE: 90 BPM | DIASTOLIC BLOOD PRESSURE: 80 MMHG | SYSTOLIC BLOOD PRESSURE: 130 MMHG

## 2018-10-26 NOTE — CT
Date of service: 



10/25/2018



PROCEDURE:  CT Abdomen and Pelvis without intravenous contrast



HISTORY:

right back pain and hematuria



COMPARISON:

12/02/2016



TECHNIQUE:

CT scan of the abdomen and pelvis was performed without 

administration of intravenous contrast. Oral contrast was not 

administered. Coronal and sagittal reformatted images were obtained. 

. 



Contrast dose: 



Radiation dose:



Total exam DLP = 536.35 mGy-cm.



This CT exam was performed using one or more of the following dose 

reduction techniques: Automated exposure control, adjustment of the 

mA and/or kV according to patient size, and/or use of iterative 

reconstruction technique.



FINDINGS:



LOWER THORAX:

There is dependent atelectasis in the lung bases. 



LIVER:

Normal in size. No intrahepatic ductal dilatation. 



GALLBLADDER AND BILE DUCTS:

Not visualized.



PANCREAS:

Normal in size. No ductal dilatation.



SPLEEN:

Normal in size.



ADRENALS:

Normal in size. No discrete nodule. 



KIDNEYS AND URETERS:

Normal in size without nephrolithiasis. No hydronephrosis. 



VASCULATURE:

No aortic aneurysm. No aortic atherosclerotic calcification or mural 

plaque present.



BOWEL:

The small bowel loops are normal in caliber.  There is scattered left 

colonic diverticulosis without CT evidence acute diverticulitis the 

colon is normal in size. No bowel dilatation or wall thickening. 



APPENDIX:

Normal appendix. 



PERITONEUM:

No free fluid. No free air. 



LYMPH NODES:

No enlarged lymph nodes.



BLADDER:

Well distended and grossly normal in appearance. 



REPRODUCTIVE:

The uterus is normal in size. 



BONES:

No acute fracture. 



OTHER FINDINGS:

None.



IMPRESSION:

No acute abdominal or pelvic abnormality.  



No hydronephrosis, nephrolithiasis or obstructive uropathy. 



A preliminary report was provided by [facility].

## 2018-12-03 ENCOUNTER — HOSPITAL ENCOUNTER (EMERGENCY)
Dept: HOSPITAL 31 - C.ER | Age: 58
Discharge: HOME | End: 2018-12-03
Payer: MEDICAID

## 2018-12-03 VITALS — RESPIRATION RATE: 16 BRPM | DIASTOLIC BLOOD PRESSURE: 81 MMHG | HEART RATE: 82 BPM | SYSTOLIC BLOOD PRESSURE: 132 MMHG

## 2018-12-03 VITALS — TEMPERATURE: 98.1 F

## 2018-12-03 VITALS — BODY MASS INDEX: 25 KG/M2

## 2018-12-03 DIAGNOSIS — J01.90: Primary | ICD-10-CM

## 2018-12-03 DIAGNOSIS — R42: ICD-10-CM

## 2018-12-03 LAB
ALBUMIN SERPL-MCNC: 4.7 G/DL (ref 3.5–5)
ALBUMIN/GLOB SERPL: 1.3 {RATIO} (ref 1–2.1)
ALT SERPL-CCNC: 53 U/L (ref 9–52)
AST SERPL-CCNC: 43 U/L (ref 14–36)
BASOPHILS # BLD AUTO: 0.2 K/UL (ref 0–0.2)
BASOPHILS NFR BLD: 3 % (ref 0–2)
BUN SERPL-MCNC: 11 MG/DL (ref 7–17)
CALCIUM SERPL-MCNC: 10.3 MG/DL (ref 8.6–10.4)
EOSINOPHIL # BLD AUTO: 0.6 K/UL (ref 0–0.7)
EOSINOPHIL NFR BLD: 9.5 % (ref 0–4)
ERYTHROCYTE [DISTWIDTH] IN BLOOD BY AUTOMATED COUNT: 13.1 % (ref 11.5–14.5)
GFR NON-AFRICAN AMERICAN: > 60
HGB BLD-MCNC: 15.2 G/DL (ref 11–16)
LYMPHOCYTES # BLD AUTO: 2.5 K/UL (ref 1–4.3)
LYMPHOCYTES NFR BLD AUTO: 42.6 % (ref 20–40)
MCH RBC QN AUTO: 28.9 PG (ref 27–31)
MCHC RBC AUTO-ENTMCNC: 33.5 G/DL (ref 33–37)
MCV RBC AUTO: 86.5 FL (ref 81–99)
MONOCYTES # BLD: 0.3 K/UL (ref 0–0.8)
MONOCYTES NFR BLD: 5.8 % (ref 0–10)
NEUTROPHILS # BLD: 2.3 K/UL (ref 1.8–7)
NEUTROPHILS NFR BLD AUTO: 39.1 % (ref 50–75)
NRBC BLD AUTO-RTO: 0.3 % (ref 0–2)
PLATELET # BLD: 231 K/UL (ref 130–400)
PMV BLD AUTO: 11.4 FL (ref 7.2–11.7)
RBC # BLD AUTO: 5.24 MIL/UL (ref 3.8–5.2)
WBC # BLD AUTO: 5.8 K/UL (ref 4.8–10.8)

## 2018-12-03 NOTE — C.PDOC
History Of Present Illness


58 year old female presents to the ED for evaluation of right-sided head 

discomfort and dizziness which began a few hours prior to arrival. Patient 

states she has been experiencing mild upper respiratory symptoms within the last

week. Review of prior records shows patient has many prior ER evaluations. 

Patient claims her electrolytes are sometimes abnormal, and requests evaluation.

Patient denies fever, chills, nausea, vomiting. 


Time Seen by Provider: 12/03/18 13:16


Chief Complaint (Nursing): Dizziness/Lightheaded


History Per: Patient


History/Exam Limitations: no limitations


Onset/Duration Of Symptoms: Hrs


Current Symptoms Are (Timing): Still Present





Past Medical History


Reviewed: Historical Data, Nursing Documentation, Vital Signs


Vital Signs: 





                                Last Vital Signs











Temp  98.1 F   12/03/18 12:21


 


Pulse  72   12/03/18 12:21


 


Resp  18   12/03/18 12:21


 


BP  133/83   12/03/18 12:21


 


Pulse Ox  98   12/03/18 12:21














- Medical History


PMH: Diabetes (type II)


Surgical History: No Surg Hx


Family History: States: CAD, Diabetes





- Social History


Hx Tobacco Use: No


Hx Alcohol Use: No


Hx Substance Use: No





- Immunization History


Hx Tetanus Toxoid Vaccination: No


Hx Influenza Vaccination: No


Hx Pneumococcal Vaccination: No





Review Of Systems


Neurological: Positive for: Headache (right-sided ), Dizziness





Physical Exam





- Physical Exam


Appears: Non-toxic, No Acute Distress, Other (anxious black female )


Skin: Normal Color, Warm, Dry


Head: Atraumatic, Normacephalic


Eye(s): bilateral: Normal Inspection


Ear(s): Bilateral: Normal


Nose: No Discharge, Other (increased nasal erythema, right>left, with kissing 

turbinates )


Oral Mucosa: Moist


Throat: Normal, No Erythema, No Exudate


Neck: Supple


Chest: Symmetrical, No Deformity, No Tenderness


Cardiovascular: Rhythm Regular, No Murmur


Respiratory: Normal Breath Sounds, No Rales, No Rhonchi, No Wheezing


Extremity: Normal ROM, Capillary Refill (less than 2 seconds )


Neurological/Psych: Oriented x3, Normal Speech, Normal Cognition





ED Course And Treatment





- Laboratory Results


Result Diagrams: 


                                 12/03/18 13:32





                                 12/03/18 13:32


Lab Interpretation: Normal (mild elev glu)


O2 Sat by Pulse Oximetry: 98 (on RA)


Pulse Ox Interpretation: Normal


Progress Note: Motrin PO and Sudafed PO given.





Medical Decision Making


Medical Decision Making: 





nasal congestion R>L


normal labs


LOW susp of CVA








Disposition


Doctor Will See Patient In The: Office


Counseled Patient/Family Regarding: Studies Performed, Diagnosis





- Disposition


Referrals: 


[facility] [Outside]


[facility] [Outside]


[facility] [Outside]


[facility] [Outside]


Disposition: HOME/ ROUTINE


Disposition Time: 15:00


Condition: GOOD


Additional Instructions: 


Cold and SINUS medications (OTC) as needed


Psdudafed 30 mg every 6 hours as needed for nasal congestion





Flonase/Nasonex nasal sprays 1 spray each nostril every 12 hours as needed. 


decreases nasal congestion


Instructions:  Phenylephrine (Systemic), Cough, Runny Nose, and the Common Cold 

(DC), Pseudoephedrine


Forms:  [facility] (English)





- Clinical Impression


Clinical Impression: 


 Dizziness, Acute inflammation of nasal sinus








- Scribe Statement


The provider has reviewed the documentation as recorded by the Scribe (Madelyn Asencio)


Provider Attestation: 








All medical record entries made by the Scribe were at my direction and 

personally dictated by me. I have reviewed the chart and agree that the record 

accurately reflects my personal performance of the history, physical exam, 

medical decision making, and the department course for this patient. I have also

 personally directed, reviewed, and agree with the discharge instructions and 

disposition.

## 2018-12-03 NOTE — C.PDOC
Time Seen by Provider: 12/03/18 13:16


Chief Complaint (Nursing): Dizziness/Lightheaded





Past Medical History


Vital Signs: 





                                Last Vital Signs











Temp  98.1 F   12/03/18 12:21


 


Pulse  72   12/03/18 12:21


 


Resp  18   12/03/18 12:21


 


BP  133/83   12/03/18 12:21


 


Pulse Ox  98   12/03/18 12:21














- Medical History


PMH: Diabetes (type II)


Family History: States: CAD, Diabetes





- Social History


Hx Tobacco Use: No


Hx Alcohol Use: No


Hx Substance Use: No





- Immunization History


Hx Tetanus Toxoid Vaccination: No


Hx Influenza Vaccination: No


Hx Pneumococcal Vaccination: No





ED Course And Treatment





- Laboratory Results


Result Diagrams: 


                                 12/03/18 13:32





                                 12/03/18 13:32


Lab Interpretation: Normal (mild elev glu)


O2 Sat by Pulse Oximetry: 98





Medical Decision Making


Medical Decision Making: 





nasal congestion R>L


normal labs


LOW susp of CVA





Disposition


Doctor Will See Patient In The: Office


Counseled Patient/Family Regarding: Studies Performed, Diagnosis





- Disposition


Disposition: HOME/ ROUTINE


Disposition Time: 14:52


Condition: GOOD





- Clinical Impression


Clinical Impression: 


 Dizziness, Acute inflammation of nasal sinus

## 2018-12-04 VITALS — OXYGEN SATURATION: 98 %

## 2019-04-12 ENCOUNTER — HOSPITAL ENCOUNTER (EMERGENCY)
Dept: HOSPITAL 31 - C.ER | Age: 59
Discharge: HOME | End: 2019-04-12
Payer: MEDICAID

## 2019-04-12 VITALS
RESPIRATION RATE: 20 BRPM | SYSTOLIC BLOOD PRESSURE: 124 MMHG | DIASTOLIC BLOOD PRESSURE: 78 MMHG | TEMPERATURE: 97.9 F | HEART RATE: 77 BPM

## 2019-04-12 VITALS — BODY MASS INDEX: 25 KG/M2

## 2019-04-12 VITALS — OXYGEN SATURATION: 98 %

## 2019-04-12 DIAGNOSIS — R51: Primary | ICD-10-CM

## 2019-04-12 LAB
ALBUMIN SERPL-MCNC: 4 G/DL (ref 3.5–5)
ALBUMIN/GLOB SERPL: 1.3 {RATIO} (ref 1–2.1)
ALT SERPL-CCNC: 24 U/L (ref 9–52)
AST SERPL-CCNC: 26 U/L (ref 14–36)
BASOPHILS # BLD AUTO: 0.1 K/UL (ref 0–0.2)
BASOPHILS NFR BLD: 1.3 % (ref 0–2)
BILIRUB UR-MCNC: NEGATIVE MG/DL
BUN SERPL-MCNC: 13 MG/DL (ref 7–17)
CALCIUM SERPL-MCNC: 10.4 MG/DL (ref 8.6–10.4)
EOSINOPHIL # BLD AUTO: 0.3 K/UL (ref 0–0.7)
EOSINOPHIL NFR BLD: 7.8 % (ref 0–4)
ERYTHROCYTE [DISTWIDTH] IN BLOOD BY AUTOMATED COUNT: 13.2 % (ref 11.5–14.5)
GFR NON-AFRICAN AMERICAN: > 60
GLUCOSE UR STRIP-MCNC: (no result) MG/DL
HGB BLD-MCNC: 13.9 G/DL (ref 11–16)
HYALINE CASTS #/AREA URNS LPF: (no result) /LPF (ref 0–2)
LEUKOCYTE ESTERASE UR-ACNC: (no result) LEU/UL
LIPASE: 124 U/L (ref 23–300)
LYMPHOCYTES # BLD AUTO: 1.7 K/UL (ref 1–4.3)
LYMPHOCYTES NFR BLD AUTO: 42 % (ref 20–40)
MCH RBC QN AUTO: 30 PG (ref 27–31)
MCHC RBC AUTO-ENTMCNC: 34 G/DL (ref 33–37)
MCV RBC AUTO: 88.4 FL (ref 81–99)
MONOCYTES # BLD: 0.3 K/UL (ref 0–0.8)
MONOCYTES NFR BLD: 7.2 % (ref 0–10)
NEUTROPHILS # BLD: 1.7 K/UL (ref 1.8–7)
NEUTROPHILS NFR BLD AUTO: 41.7 % (ref 50–75)
NRBC BLD AUTO-RTO: 0 % (ref 0–2)
PH UR STRIP: 5 [PH] (ref 5–8)
PLATELET # BLD: 196 K/UL (ref 130–400)
PMV BLD AUTO: 10.9 FL (ref 7.2–11.7)
PROT UR STRIP-MCNC: NEGATIVE MG/DL
RBC # BLD AUTO: 4.62 MIL/UL (ref 3.8–5.2)
RBC # UR STRIP: (no result) /UL
SP GR UR STRIP: 1.02 (ref 1–1.03)
SQUAMOUS EPITHIAL: 1 /HPF (ref 0–5)
UROBILINOGEN UR-MCNC: NORMAL MG/DL (ref 0.2–1)
WBC # BLD AUTO: 4 K/UL (ref 4.8–10.8)

## 2019-04-12 PROCEDURE — 80053 COMPREHEN METABOLIC PANEL: CPT

## 2019-04-12 PROCEDURE — 96374 THER/PROPH/DIAG INJ IV PUSH: CPT

## 2019-04-12 PROCEDURE — 80324 DRUG SCREEN AMPHETAMINES 1/2: CPT

## 2019-04-12 PROCEDURE — 80345 DRUG SCREENING BARBITURATES: CPT

## 2019-04-12 PROCEDURE — 83992 ASSAY FOR PHENCYCLIDINE: CPT

## 2019-04-12 PROCEDURE — 83690 ASSAY OF LIPASE: CPT

## 2019-04-12 PROCEDURE — 80358 DRUG SCREENING METHADONE: CPT

## 2019-04-12 PROCEDURE — 80346 BENZODIAZEPINES1-12: CPT

## 2019-04-12 PROCEDURE — 80353 DRUG SCREENING COCAINE: CPT

## 2019-04-12 PROCEDURE — 80361 OPIATES 1 OR MORE: CPT

## 2019-04-12 PROCEDURE — 80349 CANNABINOIDS NATURAL: CPT

## 2019-04-12 PROCEDURE — 85025 COMPLETE CBC W/AUTO DIFF WBC: CPT

## 2019-04-12 PROCEDURE — 99285 EMERGENCY DEPT VISIT HI MDM: CPT

## 2019-04-12 PROCEDURE — 84484 ASSAY OF TROPONIN QUANT: CPT

## 2019-04-12 PROCEDURE — 71046 X-RAY EXAM CHEST 2 VIEWS: CPT

## 2019-04-12 PROCEDURE — 81001 URINALYSIS AUTO W/SCOPE: CPT

## 2019-04-12 NOTE — C.PDOC
History Of Present Illness


60 y/o female pt presents to the ER c/o headache. Associated sx includes mild 

dizziness. Pt notes she took her DM medications but has not checked her sugar 

yet. Pt has many prior ER visits. Pt has no other complaints or associated sx at

this time. Pt notes she took two naproxen tablets earlier today. 


Time Seen by Provider: 04/12/19 17:12


Chief Complaint (Nursing): Headache


History Per: Patient


History/Exam Limitations: no limitations


Onset/Duration Of Symptoms: Days


Current Symptoms Are (Timing): Gone





Past Medical History


Reviewed: Historical Data, Nursing Documentation, Vital Signs


Vital Signs: 





                                Last Vital Signs











Temp  98.2 F   04/12/19 16:31


 


Pulse  80   04/12/19 16:31


 


Resp  16   04/12/19 16:31


 


BP  126/78   04/12/19 16:31


 


Pulse Ox  98   04/12/19 16:31














- Medical History


PMH: Diabetes (type II)


Family History: States: CAD, Diabetes





- Social History


Hx Tobacco Use: No


Hx Alcohol Use: No


Hx Substance Use: No





- Immunization History


Hx Tetanus Toxoid Vaccination: No


Hx Influenza Vaccination: No


Hx Pneumococcal Vaccination: No





Review Of Systems


Except As Marked, All Systems Reviewed And Found Negative.


Constitutional: Negative for: Fever, Chills


Neurological: Positive for: Dizziness





Physical Exam





- Physical Exam


Appears: Non-toxic, No Acute Distress, Other (black female)


Skin: Warm, Dry


Head: Atraumatic, Normacephalic, No Other (no dizziness )


Eye(s): bilateral: PERRL, EOMI


Oral Mucosa: Moist


Throat: Normal


Neck: Normal ROM, Supple


Chest: Symmetrical


Cardiovascular: Rhythm Regular


Respiratory: Normal Breath Sounds


Gastrointestinal/Abdominal: Soft, No Tenderness


Neurological/Psych: Oriented x3, Normal Speech, Normal Cognition





ED Course And Treatment





- Laboratory Results


Result Diagrams: 


                                 04/12/19 17:47





                                 04/12/19 17:47


Lab Interpretation: Normal (trop neg)


O2 Sat by Pulse Oximetry: 98 (RA)


Pulse Ox Interpretation: Normal





- Radiology


CXR: Interpreted by Me


CXR Interpretation: Yes: No Acute Disease


Reevaluation Time: 18:39


Reassessment Condition: Improved





Medical Decision Making


Medical Decision Making: 


Plans: 


-- chem labs 


-- blood work 


-- EKG 


-- CXR 


-- Toradol 





mod well controlled DM


mild HA


no neuro s/s


ok for d/c. 





Disposition


Doctor Will See Patient In The: Office


Counseled Patient/Family Regarding: Studies Performed, Diagnosis





- Disposition


Disposition: HOME/ ROUTINE


Disposition Time: 18:39


Condition: GOOD


Forms:  [facility] (English)





- Clinical Impression


Clinical Impression: 


 Headache








- Scribe Statement


The provider has reviewed the documentation as recorded by the Humberto Wheat Do


Provider Attestation: 


All medical record entries made by the Steveibe were at my direction and 

personally dictated by me. I have reviewed the chart and agree that the record 

accurately reflects my personal performance of the history, physical exam, 

medical decision making, and the department course for this patient. I have also

personally directed, reviewed, and agree with the discharge instructions and 

disposition.

## 2019-04-12 NOTE — RAD
Date of service: 



04/12/2019



HISTORY:

 abd pain 



COMPARISON:

10/12/2018



TECHNIQUE:

Chest PA and lateral views



FINDINGS:



LUNGS:

No active pulmonary disease.



PLEURA:

No significant pleural effusion identified. No pneumothorax apparent.



CARDIOVASCULAR:

No aortic atherosclerotic calcification present.



Normal cardiac size. No pulmonary vascular congestion. 



OSSEOUS STRUCTURES:

No significant abnormalities.



VISUALIZED UPPER ABDOMEN:

Normal.



OTHER FINDINGS:

None.



IMPRESSION:

No active disease.

## 2020-01-25 NOTE — C.PDOC
History Of Present Illness


57-year-old female presents to emergency department for evaluation of abscess 

to low back that developed 5 days ago. Patient was seen 4 days ago in ED, and 

yesterday with similar complaints and she was discharged with Clindamycin and 

Naproxen, she states the area started draining last night which prompted visit 

to ED today. Patient denies fever, chills, nausea, vomiting or any other 

complaints at this time.


Time Seen by Provider: 03/06/18 17:19


Chief Complaint (Nursing): Abnormal Skin Integrity


History Per: Patient


History/Exam Limitations: no limitations


Onset/Duration Of Symptoms: Days


Current Symptoms Are (Timing): Still Present





Past Medical History


Reviewed: Historical Data, Nursing Documentation, Vital Signs


Vital Signs: 


 Last Vital Signs











Temp  99.2 F   03/06/18 17:04


 


Pulse  106 H  03/06/18 17:04


 


Resp  18   03/06/18 17:04


 


BP  149/89   03/06/18 17:04


 


Pulse Ox  98   03/06/18 20:23














- Medical History


PMH: Diabetes (type II), HTN


   Denies: Hypercholesterolemia


Family History: States: CAD, Diabetes





- Social History


Hx Tobacco Use: No


Hx Alcohol Use: No


Hx Substance Use: No





- Immunization History


Hx Tetanus Toxoid Vaccination: No


Hx Influenza Vaccination: No


Hx Pneumococcal Vaccination: No





Review Of Systems


Constitutional: Negative for: Fever


Gastrointestinal: Negative for: Nausea, Vomiting


Skin: Positive for: Other (abscess)





Physical Exam





- Physical Exam


Appears: Non-toxic, No Acute Distress


Skin: Warm, Dry, No Rash, Other (5cm area of erythema, tenderness and swelling 

with purulent draining from multiple central spots. )


Head: Normacephalic


Eye(s): bilateral: Normal Inspection, EOMI


Nose: Normal


Oral Mucosa: Moist


Neck: Normal ROM, Supple


Chest: Symmetrical


Respiratory: No Accessory Muscle Use


Gastrointestinal/Abdominal: Soft, No Tenderness


Back: No CVA Tenderness, No Vertebral Tenderness


Extremity: Normal ROM, No Deformity, No Swelling


Neurological/Psych: Oriented x3, Normal Speech





ED Course And Treatment


O2 Sat by Pulse Oximetry: 98


Progress Note: Discussed wound care and wound check in 2 days.





- Incision & Drainage Of Abscess


Anesthesia: Lidocaine 1%


Prep Used: Sterile Water, Betadine


Procedure: Incised W/Scalpel Blade#: (11), Drained Pus, Irrigated Cavity W/

Saline, Probed To Break Up Loculations, Packed W/Gauze (1/2)





Disposition





- Disposition


Disposition: HOME/ ROUTINE


Disposition Time: 18:03


Condition: STABLE


Additional Instructions: 


Keep area clean and dry. May wash gently with soap and water, do not use 

alcohol or iodine solution.  Return to ER for packing removal in 2 days. 


Instructions:  Abscess Drainage, Percutaneous (DC)


Forms:  [facility] (English)





- Clinical Impression


Clinical Impression: 


 Incisional abscess








- Scribe Statement


The provider has reviewed the documentation as recorded by the Scribe (Riccardo Finney)








All medical record entries made by the Scribe were at my direction and 

personally dictated by me. I have reviewed the chart and agree that the record 

accurately reflects my personal performance of the history, physical exam, 

medical decision making, and the department course for this patient. I have 

also personally directed, reviewed, and agree with the discharge instructions 

and disposition. No

## 2024-05-11 NOTE — C.PDOC
History Of Present Illness


56 y/o female presents to ED for evaluation of abscess to low back developed 4 

days ago. Patient was seen 3 days ago at ED with similar complaints and was 

given Keflex and Bactrim. Patient reports she stopped taking medication 2 days 

ago because she developed tingling sensation to right arm and states yesterday 

area started draining which prompted visit to ED today. Patient denies fever, 

chills, nausea, vomiting or any other complaints at this time. 


Time Seen by Provider: 03/05/18 07:15


Chief Complaint (Nursing): Abnormal Skin Integrity


History Per: Patient


History/Exam Limitations: no limitations


Onset/Duration Of Symptoms: Days


Current Symptoms Are (Timing): Still Present





Past Medical History


Reviewed: Historical Data, Nursing Documentation, Vital Signs


Vital Signs: 


 Last Vital Signs











Temp  99 F   03/05/18 07:11


 


Pulse  88   03/05/18 07:11


 


Resp  18   03/05/18 07:11


 


BP  130/83   03/05/18 07:11


 


Pulse Ox  99   03/05/18 08:11














- Medical History


PMH: Diabetes (type II), HTN


Surgical History: No Surg Hx


Family History: States: No Known Family Hx, CAD, Diabetes





- Social History


Hx Tobacco Use: No


Hx Alcohol Use: No


Hx Substance Use: No





- Immunization History


Hx Tetanus Toxoid Vaccination: No


Hx Influenza Vaccination: No


Hx Pneumococcal Vaccination: No





Review Of Systems


Except As Marked, All Systems Reviewed And Found Negative.


Skin: Positive for: Other (Abscess to low back)





Physical Exam





- Physical Exam


Appears: Non-toxic, No Acute Distress


Skin: Warm, Dry, No Rash, Other (4-5cm area of enduration with central pustule. 

Mildly erythematous and tender to palpation. No active drainage )


Head: Atraumatic, Normacephalic


Eye(s): bilateral: Normal Inspection


Oral Mucosa: Moist


Neck: Normal ROM, Supple


Cardiovascular: Rhythm Regular


Respiratory: Normal Breath Sounds, No Rales, No Rhonchi, No Wheezing


Gastrointestinal/Abdominal: Soft, No Tenderness, No Guarding, No Rebound


Extremity: Normal ROM, Capillary Refill (<2 seconds)


Neurological/Psych: Oriented x3, Normal Speech, Normal Motor, Normal Sensation





ED Course And Treatment


O2 Sat by Pulse Oximetry: 99 (RA)


Pulse Ox Interpretation: Normal


Progress Note: Clindamycin and Naproxen administered.  Patient refusing other 

antibiotic secondary to tingling feeling.  Patient medication changed and 

advised to apply warm compress to area.





Disposition


Counseled Patient/Family Regarding: Diagnosis, Need For Followup, Rx Given





- Disposition


Referrals: 


Amarilis Jernigan MD [Medical Doctor] - 


Disposition: HOME/ ROUTINE


Disposition Time: 08:20


Condition: STABLE


Additional Instructions: 


USE MEDICATIONS AS DIRECTED





APPLY WARM COMPRESSES TO AREA SEVERAL TIMES DAILY





RETURN TO ER IF SYMPTOMS WORSEN





FOLLOW UP WITH YOUR DOCTOR/CLINIC IN 1-2 DAYS


Prescriptions: 


Clindamycin [Cleocin] 300 mg PO TID #21 cap


Naproxen 375 mg PO BID PRN #20 tablet


 PRN Reason: pain


Instructions:  Skin Abscess


Forms:  [facility] (English)


Print Language: ENGLISH





- Clinical Impression


Clinical Impression: 


 Abscess of lower back








- Scribe Statement


The provider has reviewed the documentation as recorded by the Steveibdanyell Engel





All medical record entries made by the Humberto were at my direction and 

personally dictated by me. I have reviewed the chart and agree that the record 

accurately reflects my personal performance of the history, physical exam, 

medical decision making, and the department course for this patient. I have 

also personally directed, reviewed, and agree with the discharge instructions 

and disposition.
36.8